# Patient Record
Sex: FEMALE | Race: WHITE | NOT HISPANIC OR LATINO | Employment: STUDENT | URBAN - METROPOLITAN AREA
[De-identification: names, ages, dates, MRNs, and addresses within clinical notes are randomized per-mention and may not be internally consistent; named-entity substitution may affect disease eponyms.]

---

## 2017-03-10 ENCOUNTER — ALLSCRIPTS OFFICE VISIT (OUTPATIENT)
Dept: OTHER | Facility: OTHER | Age: 22
End: 2017-03-10

## 2017-05-10 ENCOUNTER — ALLSCRIPTS OFFICE VISIT (OUTPATIENT)
Dept: OTHER | Facility: OTHER | Age: 22
End: 2017-05-10

## 2017-05-10 DIAGNOSIS — Z11.3 ENCOUNTER FOR SCREENING FOR INFECTIONS WITH PREDOMINANTLY SEXUAL MODE OF TRANSMISSION: ICD-10-CM

## 2017-05-15 LAB
CHLAMYDIA, LIQUID-BASED (HISTORICAL): NOT DETECTED
GC BY MOL. METHOD (HISTORICAL): NOT DETECTED
PAP (HISTORICAL): NORMAL

## 2017-12-19 ENCOUNTER — ALLSCRIPTS OFFICE VISIT (OUTPATIENT)
Dept: OTHER | Facility: OTHER | Age: 22
End: 2017-12-19

## 2017-12-19 DIAGNOSIS — F41.9 ANXIETY DISORDER: ICD-10-CM

## 2017-12-19 DIAGNOSIS — Z79.899 OTHER LONG TERM (CURRENT) DRUG THERAPY: ICD-10-CM

## 2017-12-20 ENCOUNTER — LAB CONVERSION - ENCOUNTER (OUTPATIENT)
Dept: OTHER | Facility: OTHER | Age: 22
End: 2017-12-20

## 2017-12-20 LAB
A/G RATIO (HISTORICAL): 1.7 (CALC) (ref 1–2.5)
ALBUMIN SERPL BCP-MCNC: 4.2 G/DL (ref 3.6–5.1)
ALP SERPL-CCNC: 73 U/L (ref 33–115)
ALT SERPL W P-5'-P-CCNC: 11 U/L (ref 6–29)
AST SERPL W P-5'-P-CCNC: 16 U/L (ref 10–30)
BASOPHILS # BLD AUTO: 0.8 %
BASOPHILS # BLD AUTO: 49 CELLS/UL (ref 0–200)
BILIRUB SERPL-MCNC: 0.5 MG/DL (ref 0.2–1.2)
BUN SERPL-MCNC: 11 MG/DL (ref 7–25)
BUN/CREA RATIO (HISTORICAL): NORMAL (CALC) (ref 6–22)
CALCIUM SERPL-MCNC: 9.2 MG/DL (ref 8.6–10.2)
CHLORIDE SERPL-SCNC: 107 MMOL/L (ref 98–110)
CO2 SERPL-SCNC: 27 MMOL/L (ref 20–31)
CREAT SERPL-MCNC: 0.76 MG/DL (ref 0.5–1.1)
DEPRECATED RDW RBC AUTO: 12.1 % (ref 11–15)
EGFR AFRICAN AMERICAN (HISTORICAL): 129 ML/MIN/1.73M2
EGFR-AMERICAN CALC (HISTORICAL): 111 ML/MIN/1.73M2
EOSINOPHIL # BLD AUTO: 0.7 %
EOSINOPHIL # BLD AUTO: 43 CELLS/UL (ref 15–500)
GAMMA GLOBULIN (HISTORICAL): 2.5 G/DL (CALC) (ref 1.9–3.7)
GLUCOSE (HISTORICAL): 88 MG/DL (ref 65–99)
HCT VFR BLD AUTO: 37.4 % (ref 35–45)
HGB BLD-MCNC: 13 G/DL (ref 11.7–15.5)
LYMPHOCYTES # BLD AUTO: 2233 CELLS/UL (ref 850–3900)
LYMPHOCYTES # BLD AUTO: 36.6 %
MCH RBC QN AUTO: 30.3 PG (ref 27–33)
MCHC RBC AUTO-ENTMCNC: 34.8 G/DL (ref 32–36)
MCV RBC AUTO: 87.2 FL (ref 80–100)
MONOCYTES # BLD AUTO: 512 CELLS/UL (ref 200–950)
MONOCYTES (HISTORICAL): 8.4 %
NEUTROPHILS # BLD AUTO: 3264 CELLS/UL (ref 1500–7800)
NEUTROPHILS # BLD AUTO: 53.5 %
PLATELET # BLD AUTO: 187 THOUSAND/UL (ref 140–400)
PMV BLD AUTO: 10.6 FL (ref 7.5–12.5)
POTASSIUM SERPL-SCNC: 3.9 MMOL/L (ref 3.5–5.3)
RBC # BLD AUTO: 4.29 MILLION/UL (ref 3.8–5.1)
SODIUM SERPL-SCNC: 139 MMOL/L (ref 135–146)
TOTAL PROTEIN (HISTORICAL): 6.7 G/DL (ref 6.1–8.1)
TSH SERPL DL<=0.05 MIU/L-ACNC: 1.3 MIU/L
WBC # BLD AUTO: 6.1 THOUSAND/UL (ref 3.8–10.8)

## 2017-12-21 ENCOUNTER — GENERIC CONVERSION - ENCOUNTER (OUTPATIENT)
Dept: OTHER | Facility: OTHER | Age: 22
End: 2017-12-21

## 2018-01-10 NOTE — MISCELLANEOUS
Message  Return to work or school:  01/07/2016   She is able to return to work on  01/13/2016      PLEASE EXCUSE Josef Florence FROM WORK 01/8-01/12 MAY RETURN 01/13/2016     DR Oksana Sterling MD/ [place] MA       Signatures   Electronically signed by : Ronaldo Martinez, ; Jan 12 2016  5:00PM EST                       (Author)

## 2018-01-10 NOTE — PROGRESS NOTES
Chief Complaint  Patient presented in office for #3 Trumemba vaccine  Active Problems    1  Anxiety (300 00) (F41 9)   2  History of Birth Control Method - Oral Contraceptives   3  High risk medication use (V58 69) (Z79 899)   4  Insertion of Nexplanon (V25 5) (Z30 017)   5  Insomnia (780 52) (G47 00)   6  Need for meningococcal vaccination (V03 89) (Z23)   7  Vaginal yeast infection (112 1) (B37 3)    Current Meds   1  Gynazole-1 2 % Vaginal Cream; INSERT 1 APPLICATORFUL INTRAVAGINALLY AT   BEDTIME; Therapy: 38LXW8974 to (Last Rx:16Nov2016)  Requested for: 77GJY4439 Ordered   2  Nexplanon 68 MG Subcutaneous Implant; Therapy: (Recorded:27Jun2016) to Recorded   3  Proventil  (90 Base) MCG/ACT Inhalation Aerosol Solution; 2 puff INH q 4-6 h   prn; Therapy: 20KTV3272 to (Last Rx:51Vpu5642)  Requested for: 03Njk1010 Ordered    Allergies    1  Delsym LQCR   2   Robitussin DM SYRP    Plan  Need for meningococcal vaccination    · Trumenba Intramuscular Suspension Prefilled Syringe    Future Appointments    Date/Time Provider Specialty Site   03/15/2017 02:00 PM Bessie Vaughn, [facility] Obstetrics/Gynecology [facility]     Signatures   Electronically signed by : Marcelo Hsieh, ; Mar 10 2017  1:01PM EST                       (Author)    Electronically signed by : Juliano Castillo; Mar 10 2017  1:07PM EST                       (Author)    Electronically signed by : Marco A Hawikns DO; Mar 11 2017 10:21PM EST                       (Author)

## 2018-01-10 NOTE — PROGRESS NOTES
Chief Complaint  Patient presented in office for #2 Trumenba vaccine  Active Problems    1  Anxiety (300 00) (F41 9)   2  History of Birth Control Method - Oral Contraceptives   3  High risk medication use (V58 69) (Z79 899)   4  Insertion of Nexplanon (V25 5) (Z30 017)   5  Insomnia (780 52) (G47 00)   6  Need for meningococcal vaccination (V03 89) (Z23)    Current Meds   1  Fluconazole 150 MG Oral Tablet; Take pill 1 stat, skip a day, and take pill 2 on day 3; Therapy: 20ABD6061 to (Last Rx:05Oct2016)  Requested for: 05Oct2016 Ordered   2  Nexplanon 68 MG Subcutaneous Implant; Therapy: (Recorded:27Jun2016) to Recorded   3  Proventil  (90 Base) MCG/ACT Inhalation Aerosol Solution; 2 puff INH q 4-6 h   prn; Therapy: 57WMW3983 to (Last Rx:28Lvw6332)  Requested for: 14Nuq2658 Ordered    Allergies    1  Delsym LQCR   2   Robitussin DM SYRP    Plan  Need for meningococcal vaccination    · Trumenba Intramuscular Suspension Prefilled Syringe    Future Appointments    Date/Time Provider Specialty Site   03/10/2017 01:00 PM Jersey HALL, Nurse Schedule  [facility]     Signatures   Electronically signed by : Yovany Rivera, ; Oct  7 2016  1:17PM EST                       (Author)    Electronically signed by : Jalen Zarco MD; Oct  7 2016  1:52PM EST                       (Author)

## 2018-01-11 NOTE — RESULT NOTES
Verified Results  (1) ACUTE HEPATITIS PANEL 28Jun2016 09:42AM Yovany Martínez     Test Name Result Flag Reference   HEPATITIS A IGM NON-REACTIVE  NON-REACTIVE   HEPATITIS B SURFACE$ANTIGEN NON-REACTIVE  NON-REACTIVE   HEPATITIS B CORE$ANTIBODY (IGM) NON-REACTIVE  NON-REACTIVE   HEPATITIS C ANTIBODY NON-REACTIVE  NON-REACTIVE   SIGNAL TO CUT-OFF 0 03  <1 00     (1) HIV AG/AB COMBO, 4TH GEN 28Jun2016 09:42AM Yovany Martínez     Test Name Result Flag Reference   HIV AG/AB, 4TH GEN NON-REACTIVE  NON-REACTIVE   HIV-1 antigen and HIV-1/HIV-2 antibodies were not  detected  There is no laboratory evidence of HIV  infection  PLEASE NOTE: This information has been disclosed to  you from records whose confidentiality may be  protected by state law  If your state requires such  protection, then the state law prohibits you from  making any further disclosure of the information  without the specific written consent of the person  to whom it pertains, or as otherwise permitted by law  A general authorization for the release of medical or  other information is NOT sufficient for this purpose  For additional information please refer to  http://education  Karmasphere/faq/EZG337  (This link is being provided for informational/  educational purposes only )        The performance of this assay has not been clinically  validated in patients less than 3years old  [facility](R) CHLAMYDIA/ N  GONORRHOEAE RNA, TMA 37RFT1053 09:42AM Yovany Martínez     Test Name Result Flag Reference   CHLAMYDIA TRACHOMATIS$RNA, TMA NOT DETECTED  NOT DETECTED   NEISSERIA [address], TMA NOT DETECTED  NOT DETECTED   This test was performed using the [address]  ([address] )  The analytical performance characteristics of this   assay, when used to test SurePath specimens have  been determined by [employer]             (Q) RPR (DX) W/REFL TITER AND CONFIRMATORY TESTING 28Jun2016 09:42AM Cr Lockhart   REPORT COMMENT:  MULT REQ#'S  FASTING:YES     Test Name Result Flag Reference   RPR (DX) W/REFL TITER AND$CONFIRMATORY TESTING NON-REACTIVE  NON-REACTIVE

## 2018-01-12 NOTE — RESULT NOTES
Verified Results  (Q) USMAN BARR VIRUS ANTIBODY PANEL 12Jan2016 03:16PM Ludivina Gar     Test Name Result Flag Reference   USMAN METZ VIRUS VCA$ANTIBODY (IGM) < OR = 0 90     Value         Interpretation                             -----         --------------                             < or = 0 90   Negative                             0  91-1 09     Equivocal                             > or = 1 10   Positive   USMAN METZ VIRUS VCA$ANTIBODY (IGG) >5 00 H    Value         Interpretation                             -----         --------------                             < or = 0 90   Negative                             0  91-1 09     Equivocal                             > or = 1 10   Positive   USMAN BARR VIRUS (EBNA)$ANTIBODY (IGG) >5 00 H    Value         Interpretation                             -----         --------------                             < or = 0 90   Negative                             0  91-1 09     Equivocal                             > or = 1 10   Positive   INTERPRETATION:      Suggestive of a past Usman-Barr virus infection  In infants, a similar pattern may occur as a result  of passive maternal transfer of antibody  (1) ACUTE HEPATITIS PANEL 12Jan2016 03:16PM Ludivina Gar     Test Name Result Flag Reference   HEPATITIS A IGM NON-REACTIVE  NON-REACTIVE   HEPATITIS B SURFACE$ANTIGEN NON-REACTIVE  NON-REACTIVE   HEPATITIS B CORE$ANTIBODY (IGM) NON-REACTIVE  NON-REACTIVE   HEPATITIS C ANTIBODY NON-REACTIVE  NON-REACTIVE   SIGNAL TO CUT-OFF 0 08  <1 00     (1) HIV AG/AB COMBO, 4TH GEN [id number] 03:16PM Carlyn Gar     Test Name Result Flag Reference   HIV AG/AB, 4TH GEN NON-REACTIVE  NON-REACTIVE   A Nonreactive HIV Ag/Ab result does not exclude  HIV infection since the time frame for seroconversion  is variable  If acute HIV infection is suspected,  a HIV-1 RNA Qualitative TMA test is recommended       PLEASE NOTE: This information has been disclosed to you  from records whose confidentiality may be protected by  state law  If your state requires such protection, then  the state law prohibits you from making any further  disclosure of the information without the specific  written consent of the person to whom it pertains, or  as otherwise permitted by law  A general authorization  for the release of medical or other information is NOT  sufficient for this purpose  The performance of this assay has not been clinically  validated in patients less than 3years old  For additional information please refer to  http://Sepaton/faq/XZD587  (This link is being provided for informational/  educational purposes only )     (1) COMPREHENSIVE METABOLIC PANEL [id number] 68:30OZ [facility] [employer]     Test Name Result Flag Reference   GLUCOSE 85 mg/dL  65-99   Fasting reference interval   UREA NITROGEN (BUN) 16 mg/dL  7-25   CREATININE 0 81 mg/dL  0 50-1 10   eGFR NON-AFR  AMERICAN 105 mL/min/1 73m2  > OR = 60   eGFR AFRICAN AMERICAN 121 mL/min/1 73m2  > OR = 60   BUN/CREATININE RATIO   0-16   NOT APPLICABLE (calc)   SODIUM 136 mmol/L  135-146   POTASSIUM 4 8 mmol/L  3 5-5 3   CHLORIDE 104 mmol/L     CARBON DIOXIDE 17 mmol/L L 19-30   CALCIUM 9 9 mg/dL  8 6-10 2   PROTEIN, TOTAL 8 1 g/dL  6 1-8 1   ALBUMIN 4 5 g/dL  3 6-5 1   GLOBULIN 3 6 g/dL (calc)  1 9-3 7   ALBUMIN/GLOBULIN RATIO 1 3 (calc)  1 0-2 5   BILIRUBIN, TOTAL 0 3 mg/dL  0 2-1 2   ALKALINE PHOSPHATASE 75 U/L     AST 61 U/L H 10-30   ALT 49 U/L H 6-29     SURESWAB(R) CHLAMYDIA/ N  GONORRHOEAE RNA, TMA [id number] 03:16PM [facility] [facility] [employer]     Test Name Result Flag Reference   CHLAMYDIA TRACHOMATIS$RNA, TMA DETECTED A NOT DETECTED   A positive CT or NG Nucleic Acid Amplification Test  (NAAT) result should be interpreted in conjunction  with other laboratory and clinical data available to  the clinician   If clinically indicated, further   testing can be performed on the same sample using  an alternate molecular target  To order alternate  target test use 22579 (C  trachomatis) or   11960 (N  gonorrhoeae)  NEISSERIA GONORRHOEAE$RNA, TMA NOT DETECTED  NOT DETECTED   This test was performed using the [employer] COMBO2 Assay  ([address] )  The analytical performance characteristics of this   assay, when used to test SurePath specimens have  been determined by [employer]  (Q) RPR (DX) W/REFL TITER AND CONFIRMATORY TESTING [id number] 03:16PM Ludivina Gar   REPORT COMMENT:  FASTING:NO     Test Name Result Flag Reference   RPR (DX) W/REFL TITER AND$CONFIRMATORY TESTING NON-REACTIVE  NON-REACTIVE       Plan  Chlamydial infection    · Azithromycin 500 MG Oral Tablet; TAKE 2 TABLET ONCE    Discussion/Summary   Mother made aware of the chlamydia positive results, Pt treated with Azithromycin  She was also advised to inform her partner about the infection

## 2018-01-12 NOTE — PROGRESS NOTES
Chief Complaint  Patient presented in office for Trumenba vaccine  Active Problems    1  Anxiety (300 00) (F41 9)   2  History of Birth Control Method - Oral Contraceptives   3  Esophageal reflux (530 81) (K21 9)   4  Exposure to STD (V01 6) (Z20 2)   5  High risk medication use (V58 69) (Z79 899)   6  Insertion of Nexplanon (V25 5) (Z30 49)   7  Insomnia (780 52) (G47 00)    Current Meds   1  Nexplanon 68 MG Subcutaneous Implant; Therapy: (Recorded:27Jun2016) to Recorded    Allergies    1  Delsym LQCR   2   Robitussin DM SYRP    Plan  Need for meningococcal vaccination    · Trumenba Intramuscular Suspension Prefilled Syringe    Signatures   Electronically signed by : Maury Patel, ; Sep  2 2016  1:50PM EST                       (Author)    Electronically signed by : India Gifford DO; Sep  2 2016  1:59PM EST                       (Author)

## 2018-01-13 NOTE — RESULT NOTES
Message   Will call pt back when full STD panel results are available  HIV, Acute hepatitis, RPR pending  GC/ Chlamydia is negative        Verified Results  Urine Dip Automated- POC [id number] 08:13PM Ludivina Gar     Test Name Result Flag Reference   Color Yellow     Clarity Transparent     Leukocytes 500     Nitrite neg     Blood 250     Bilirubin 1     Urobilinogen norm     Protein tr     Ph 5     Specific Gravity 1 020     Ketone 15     Glucose norm       (1) URINALYSIS w URINE C/S REFLEX (will reflex a microscopy if leukocytes, occult blood, or nitrites are not within normal limits) [id number] 08:00PM Ludivina Gar     Test Name Result Flag Reference   COLOR YELLOW  YELLOW   APPEARANCE TURBID A CLEAR   SPECIFIC GRAVITY 1 024  1 001-1 035   PH 5 5  5 0-8 0   GLUCOSE NEGATIVE  NEGATIVE   BILIRUBIN NEGATIVE  NEGATIVE   KETONES TRACE A NEGATIVE   OCCULT BLOOD 2+ A NEGATIVE   PROTEIN 1+ A NEGATIVE   NITRITE NEGATIVE  NEGATIVE   LEUKOCYTE ESTERASE 3+ A NEGATIVE   WBC > OR = 60 /HPF A < OR = 5   RBC 10-20 /HPF A < OR = 2   SQUAMOUS EPITHELIAL CELLS > OR = 28 /HPF A < OR = 5   BACTERIA MODERATE /HPF A NONE SEEN   HYALINE CAST NONE SEEN /LPF  NONE SEEN   REFLEXIVE URINE CULTURE      CULTURE INDICATED - RESULTS TO FOLLOW     REFLEXIVE URINE CULTURE [id number] 08:00PM Ludivina Gar     Test Name Result Flag Reference   CULTURE, URINE, ROUTINE      CULTURE, URINE, ROUTINE         MICRO NUMBER:      [id number]    TEST STATUS:       FINAL    SPECIMEN SOURCE:   URINE    SPECIMEN QUALITY:  ADEQUATE    RESULT:            No Growth

## 2018-01-14 VITALS
HEIGHT: 66 IN | DIASTOLIC BLOOD PRESSURE: 84 MMHG | SYSTOLIC BLOOD PRESSURE: 124 MMHG | BODY MASS INDEX: 24.11 KG/M2 | WEIGHT: 150 LBS

## 2018-01-17 NOTE — PROGRESS NOTES
Assessment    1  Encounter for preventive health examination (V70 0) (Z00 00)    Plan  Health Maintenance    · Follow-up visit in 1 year Evaluation and Treatment  Follow-up  Status: Hold For -  Scheduling  Requested for: 17Eqc3385    Discussion/Summary    Pt  is here for a wellness visit  Physical forms filled out for college  Pt  encouraged to continue to exercise and eat healthy  Pt  encouraged to continue to follow-up with the GYN, dentist, and eye doctor  Pt  refuses lab work  Pt  instructed to follow-up in 1 year for a wellness visit or sooner as needed  Chief Complaint  Pt  is here for a wellness visit  History of Present Illness  HM, Adult Female: The patient is being seen for a health maintenance evaluation  Social History: Household members include mother and stepfather  Work status: full time student  The patient has never smoked cigarettes  She reports occasional alcohol use  General Health: The patient's health since the last visit is described as good  She has regular dental visits  She denies hearing loss  Immunizations status:  Pt  wears glasses and follows up with the eye doctor  Lifestyle:  She consumes a diverse and healthy diet  She exercises regularly  She exercises 3 or more times per week for 30 or more minutes per session  Exercise includes running/jogging and strength training  Reproductive health:  she uses contraception  For contraception, she uses hormone implants  she is sexually active  Screening: Cervical cancer screening includes Pt  reports that she follows up with the GYN  Metabolic screening includes uncertain timing of her last lipid profile, glucose screening performed 5054 and uncertain timing of her last thyroid function test      Safety elements used: seat belt and smoke detector  HPI: Pt  is here for a wellness visit and physical exam for [facility]  Review of Systems    Constitutional: no fever, no chills and not feeling tired     Eyes: no eye pain, eyes not red and no purulent discharge from the eyes  ENT: no earache, no sore throat and no nasal discharge  Cardiovascular: no chest pain and no palpitations  Respiratory: no shortness of breath, no cough, no wheezing and no shortness of breath during exertion  Gastrointestinal: No complaints of abdominal pain, no constipation, no nausea or vomiting, no diarrhea, no bloody stools  Genitourinary: no dysuria, no pelvic pain and no vaginal discharge  Musculoskeletal: No complaints of arthralgias, no myalgias, no joint swelling or stiffness, no limb pain or swelling  Integumentary: no rashes  Neurological: no headache, no numbness, no tingling, no dizziness, no limb weakness, no convulsions, no fainting and no difficulty walking  Psychiatric: not suicidal and no depression  Active Problems    1  Anxiety (300 00) (F41 9)   2  History of Birth Control Method - Oral Contraceptives   3  Esophageal reflux (530 81) (K21 9)   4  Exposure to STD (V01 6) (Z20 2)   5  High risk medication use (V58 69) (Z79 899)   6  Insertion of Nexplanon (V25 5) (Z30 49)   7   Insomnia (780 52) (G47 00)    Past Medical History    · History of _   · History of Acute maxillary sinusitis (461 0) (J01 00)   · History of Acute tonsillitis (463) (J03 90)   · History of Acute upper respiratory infection (465 9) (J06 9)   · History of Acute upper respiratory infection (465 9) (J06 9)   · History of Acute urinary tract infection (599 0) (N39 0)   · History of Cervicalgia (723 1) (M54 2)   · History of Chalazion of right eye (373 2) (H00 13)   · History of Encounter for gynecological examination without abnormal finding (V72 31)  (Z01 419)   · History of Encounter for gynecological examination without abnormal finding (V72 31)  (Z01 419)   · History of Exercise-induced bronchospasm (493 81) (J45 990)   · History of Exercise-induced bronchospasm (493 81) (J45 990)   · History of High risk heterosexual behavior (V69 2) (Z72 51)   · History of acne (V13 3) (Z87 2)   · History of acne (V13 3) (Z87 2)   · History of acute gastritis (V12 70) (Z87 19)   · History of acute pharyngitis (V12 69) (Z87 09)   · History of acute sinusitis (V12 69) (Z87 09)   · History of allergic rhinitis (V12 69) (Z87 09)   · History of asthma (V12 69) (Z87 09)   · History of backache (V13 59) (Z87 39)   · History of breast lump (V13 89) (L40 613)   · History of chlamydia infection (V12 09) (Z86 19)   · History of fatigue (V13 89) (E32 702)   · History of folliculitis (W97 7) (K47 8)   · History of headache (V13 89) (R80 790)   · History of infectious mononucleosis (V12 09) (Z86 19)   · History of influenza (V12 09) (Z87 09)   · History of tension headache (V13 89) (E75 032)   · History of tinea corporis (V12 09) (Z86 19)   · History of viral infection (V12 09) (Z86 19)   · History of Joint pain, knee (719 46) (M25 569)   · History of Multiple Open Wounds (879 8)   · History of Nasal congestion (478 19) (R09 81)   · History of Need for prophylactic vaccination and inoculation against influenza (V04 81)  (Z23)   · History of Open Wound Of The Foot (892 0)   · History of Pain in eye, unspecified laterality (379 91) (H57 10)   · History of Scar (709 2) (L90 5)   · History of Sprain Of The Symphysis Pubis (848 5)   · History of Vaginal yeast infection (112 1) (B37 3)   · History of Vulvovaginitis (616 10) (N76 0)    Surgical History    · History of Guidance: Concerns About Unsafe Sexual Practices (V65 45)   · History of Tonsillectomy    Family History  Mother    · No pertinent family history  Father    · No pertinent family history  Paternal Grandmother    · Family history of breast cancer in female (V16 3) (Z80 2)    Social History    · History of Birth Control Method - Oral Contraceptives   · Cultural background   · NON-   · Currently sexually active   · Never A Smoker   · Nexplanon in place (Y15 11) (Z97 5)   · 3/15/16   · No drug use   · Primary spoken language English   · Racial background   · WHITE   · Single   · Social alcohol use (Z78 9)    Current Meds   1  Nexplanon 68 MG Subcutaneous Implant; Therapy: (Recorded:27Jun2016) to Recorded    Allergies    1  Delsym LQCR   2  Robitussin DM SYRP    Vitals   Recorded: 91FJW7895 44:21JK   Systolic 277   Diastolic 58   Heart Rate 72   Respiration 16   Height 5 ft 5 5 in   Weight 140 lb    BMI Calculated 22 94   BSA Calculated 1 7     Physical Exam    Constitutional   General appearance: No acute distress, well appearing and well nourished  Eyes   Conjunctiva and lids: No swelling, erythema or discharge  Pupils and irises: Equal, round, reactive to light  Ears, Nose, Mouth, and Throat   External inspection of ears and nose: Normal     Otoscopic examination: Tympanic membranes translucent with normal light reflex  Canals patent without erythema  Nasal mucosa, septum, and turbinates: Normal without edema or erythema  Oropharynx: Normal with no erythema, edema, exudate or lesions  Neck   Neck: Supple, symmetric, trachea midline, no masses  Thyroid: Normal, no thyromegaly  Pulmonary   Respiratory effort: No increased work of breathing or signs of respiratory distress  Auscultation of lungs: Clear to auscultation  Cardiovascular   Auscultation of heart: Normal rate and rhythm, normal S1 and S2, no murmurs  radial pulses +2 bilaterally  Pedal pulses: 2+ bilaterally  No edema noted  Abdomen   Abdomen: Non-tender, no masses  Liver and spleen: No hepatomegaly or splenomegaly  Lymphatic   Palpation of lymph nodes in neck: No lymphadenopathy  Palpation of lymph nodes in groin: No lymphadenopathy  Musculoskeletal   Gait and station: Normal     Digits and nails: Normal without clubbing or cyanosis  Muscle strength/tone: Normal     Skin No rashes noted  Neurologic   Cranial nerves: Cranial nerves II-XII intact  Coordination: Normal finger to nose and heel to shin  Psychiatric   Orientation to person, place, and time: Normal     Mood and affect: Normal        Procedure    Procedure: Visual Acuity Test    Indication: routine screening  Inforrmation supplied by a Snellen chart  Results: 20/20 in both eyes with corrective device, 20/20 in the right eye with corrective device, 20/20 in the left eye with corrective device      Signatures   Electronically signed by : CHELLY Srinivasan;  Aug 16 2016  9:19PM EST                       (Author)    Electronically signed by : GEOFFREY Franco ; Aug 17 2016 12:12PM EST

## 2018-01-23 VITALS
WEIGHT: 149 LBS | DIASTOLIC BLOOD PRESSURE: 82 MMHG | SYSTOLIC BLOOD PRESSURE: 120 MMHG | RESPIRATION RATE: 16 BRPM | OXYGEN SATURATION: 99 % | HEIGHT: 66 IN | BODY MASS INDEX: 23.95 KG/M2 | HEART RATE: 78 BPM

## 2018-01-23 NOTE — RESULT NOTES
Discussion/Summary   Barbie your labs are all within normal limits  Please call the office to schedule a follow-up if you have any questions or concerns with the results  Verified Results  (Q) TSH, 3RD GENERATION W/REFLEX TO FT4 [id number] 10:56AM Ludivina Gar   REPORT COMMENT:  INCLUDES LAB REF [id number]  FASTING:YES     Test Name Result Flag Reference   TSH W/REFLEX TO FT4 1 30 mIU/L     Reference Range                         > or = 20 Years  0 40-4 50                              Pregnancy Ranges            First trimester    0 26-2 66            Second trimester   0 55-2 73            Third trimester    0 43-2 91     (1) COMPREHENSIVE METABOLIC PANEL [id number] 12:81OR Ludivina Gar     Test Name Result Flag Reference   GLUCOSE 88 mg/dL  65-99   Fasting reference interval   UREA NITROGEN (BUN) 11 mg/dL  7-25   CREATININE 0 76 mg/dL  0 50-1 10   eGFR NON-AFR   AMERICAN 111 mL/min/1 73m2  > OR = 60   eGFR AFRICAN AMERICAN 129 mL/min/1 73m2  > OR = 60   BUN/CREATININE RATIO   8-05   NOT APPLICABLE (calc)   SODIUM 139 mmol/L  135-146   POTASSIUM 3 9 mmol/L  3 5-5 3   CHLORIDE 107 mmol/L     CARBON DIOXIDE 27 mmol/L  20-31   CALCIUM 9 2 mg/dL  8 6-10 2   PROTEIN, TOTAL 6 7 g/dL  6 1-8 1   ALBUMIN 4 2 g/dL  3 6-5 1   GLOBULIN 2 5 g/dL (calc)  1 9-3 7   ALBUMIN/GLOBULIN RATIO 1 7 (calc)  1 0-2 5   BILIRUBIN, TOTAL 0 5 mg/dL  0 2-1 2   ALKALINE PHOSPHATASE 73 U/L     AST 16 U/L  10-30   ALT 11 U/L  6-29     (1) CBC/PLT/DIFF [id number] 10:56AM Ludivina Gar     Test Name Result Flag Reference   WHITE BLOOD CELL COUNT 6 1 Thousand/uL  3 8-10 8   RED BLOOD CELL COUNT 4 29 Million/uL  3 80-5 10   HEMOGLOBIN 13 0 g/dL  11 7-15 5   HEMATOCRIT 37 4 %  35 0-45 0   MCV 87 2 fL  80 0-100 0   MCH 30 3 pg  27 0-33 0   MCHC 34 8 g/dL  32 0-36 0   RDW 12 1 %  11 0-15 0   PLATELET COUNT 794 Thousand/uL  140-400   ABSOLUTE NEUTROPHILS 3264 cells/uL  9930-0285   ABSOLUTE LYMPHOCYTES 2233 cells/uL  850-3900 ABSOLUTE MONOCYTES 512 cells/uL  200-950   ABSOLUTE EOSINOPHILS 43 cells/uL     ABSOLUTE BASOPHILS 49 cells/uL  0-200   NEUTROPHILS 53 5 %     LYMPHOCYTES 36 6 %     MONOCYTES 8 4 %     EOSINOPHILS 0 7 %     BASOPHILS 0 8 %     MPV 10 6 fL  7 5-12 5

## 2018-03-12 ENCOUNTER — TELEPHONE (OUTPATIENT)
Dept: OBGYN CLINIC | Facility: CLINIC | Age: 23
End: 2018-03-12

## 2018-03-23 ENCOUNTER — TELEPHONE (OUTPATIENT)
Dept: OBGYN CLINIC | Facility: CLINIC | Age: 23
End: 2018-03-23

## 2018-03-23 DIAGNOSIS — B37.3 YEAST VAGINITIS: ICD-10-CM

## 2018-03-23 DIAGNOSIS — B96.89 BV (BACTERIAL VAGINOSIS): Primary | ICD-10-CM

## 2018-03-23 DIAGNOSIS — N76.0 BV (BACTERIAL VAGINOSIS): Primary | ICD-10-CM

## 2018-03-23 RX ORDER — FLUCONAZOLE 150 MG/1
150 TABLET ORAL EVERY OTHER DAY
Qty: 2 TABLET | Refills: 0 | OUTPATIENT
Start: 2018-03-23 | End: 2018-03-26

## 2018-03-23 RX ORDER — METRONIDAZOLE 500 MG/1
500 TABLET ORAL EVERY 12 HOURS SCHEDULED
Qty: 14 TABLET | Refills: 0 | OUTPATIENT
Start: 2018-03-23 | End: 2018-03-30

## 2018-03-23 NOTE — TELEPHONE ENCOUNTER
Mom Cuauhtemoc calling in regarding daughter, thinks she has a bacterial infection, she can tell by the smell  Can we call in pills and cream, wants to see which one is cheaper to [facility] in [address] [place] [address]  Also can we send in a script for Diflucan as well    Call

## 2018-05-11 ENCOUNTER — TELEPHONE (OUTPATIENT)
Dept: FAMILY MEDICINE CLINIC | Facility: CLINIC | Age: 23
End: 2018-05-11

## 2018-05-11 NOTE — TELEPHONE ENCOUNTER
omaira patient: Patients mom called in to say that she noticed the patients hair is really thinning and falling out  She was wondering if they can get an order for blood work to have her levels checked and her thyroid tested  The patient is leaving for [facility] for her graduate program and until she gets acclimated with a doctor down there they would like to have labs done  If we can do this can we fax them?     FAX: [phone]

## 2018-05-29 ENCOUNTER — TELEPHONE (OUTPATIENT)
Dept: OBGYN CLINIC | Facility: CLINIC | Age: 23
End: 2018-05-29

## 2018-05-29 DIAGNOSIS — Z30.41 ENCOUNTER FOR SURVEILLANCE OF CONTRACEPTIVE PILLS: Primary | ICD-10-CM

## 2018-05-30 RX ORDER — NORETHINDRONE ACETATE AND ETHINYL ESTRADIOL 1; .02 MG/1; MG/1
1 TABLET ORAL DAILY
Qty: 21 TABLET | Refills: 1 | Status: SHIPPED | OUTPATIENT
Start: 2018-05-30 | End: 2018-08-27 | Stop reason: SDUPTHER

## 2018-08-23 DIAGNOSIS — B37.3 VAGINAL YEAST INFECTION: Primary | ICD-10-CM

## 2018-08-23 RX ORDER — FLUCONAZOLE 150 MG/1
150 TABLET ORAL EVERY OTHER DAY
Qty: 2 TABLET | Refills: 0 | Status: SHIPPED | OUTPATIENT
Start: 2018-08-23 | End: 2018-08-26

## 2018-08-27 DIAGNOSIS — Z30.41 ENCOUNTER FOR SURVEILLANCE OF CONTRACEPTIVE PILLS: ICD-10-CM

## 2018-08-28 RX ORDER — NORETHINDRONE ACETATE AND ETHINYL ESTRADIOL 1; .02 MG/1; MG/1
1 TABLET ORAL DAILY
Qty: 21 TABLET | Refills: 0 | Status: SHIPPED | OUTPATIENT
Start: 2018-08-28 | End: 2018-09-18 | Stop reason: SDUPTHER

## 2018-09-18 DIAGNOSIS — Z30.41 ENCOUNTER FOR SURVEILLANCE OF CONTRACEPTIVE PILLS: ICD-10-CM

## 2018-09-18 RX ORDER — NORETHINDRONE ACETATE AND ETHINYL ESTRADIOL 1; 20 MG/1; UG/1
TABLET ORAL
Qty: 21 TABLET | Refills: 2 | Status: SHIPPED | OUTPATIENT
Start: 2018-09-18 | End: 2018-12-17

## 2018-09-21 DIAGNOSIS — B96.89 BACTERIAL VAGINOSIS: Primary | ICD-10-CM

## 2018-09-21 DIAGNOSIS — N76.0 BACTERIAL VAGINOSIS: Primary | ICD-10-CM

## 2018-09-21 RX ORDER — METRONIDAZOLE 500 MG/1
500 TABLET ORAL 2 TIMES DAILY
Qty: 14 TABLET | Refills: 0 | Status: SHIPPED | OUTPATIENT
Start: 2018-09-21 | End: 2018-09-28

## 2018-10-26 DIAGNOSIS — B37.3 YEAST VAGINITIS: Primary | ICD-10-CM

## 2018-10-26 RX ORDER — FLUCONAZOLE 150 MG/1
150 TABLET ORAL EVERY OTHER DAY
Qty: 1 TABLET | Refills: 0 | Status: SHIPPED | OUTPATIENT
Start: 2018-10-26 | End: 2018-10-29

## 2018-11-21 ENCOUNTER — IMMUNIZATION (OUTPATIENT)
Dept: FAMILY MEDICINE CLINIC | Facility: CLINIC | Age: 23
End: 2018-11-21
Payer: COMMERCIAL

## 2018-11-21 DIAGNOSIS — Z23 ENCOUNTER FOR IMMUNIZATION: ICD-10-CM

## 2018-11-21 PROCEDURE — 90686 IIV4 VACC NO PRSV 0.5 ML IM: CPT

## 2018-11-21 PROCEDURE — 90471 IMMUNIZATION ADMIN: CPT

## 2018-12-03 ENCOUNTER — TELEPHONE (OUTPATIENT)
Dept: OBGYN CLINIC | Facility: CLINIC | Age: 23
End: 2018-12-03

## 2018-12-17 ENCOUNTER — ANNUAL EXAM (OUTPATIENT)
Dept: OBGYN CLINIC | Facility: CLINIC | Age: 23
End: 2018-12-17
Payer: COMMERCIAL

## 2018-12-17 VITALS
WEIGHT: 136 LBS | HEIGHT: 65 IN | DIASTOLIC BLOOD PRESSURE: 80 MMHG | SYSTOLIC BLOOD PRESSURE: 120 MMHG | BODY MASS INDEX: 22.66 KG/M2

## 2018-12-17 DIAGNOSIS — Z11.3 SCREENING FOR STD (SEXUALLY TRANSMITTED DISEASE): ICD-10-CM

## 2018-12-17 DIAGNOSIS — N76.0 RECURRENT VAGINITIS: ICD-10-CM

## 2018-12-17 DIAGNOSIS — Z01.419 WELL WOMAN EXAM: Primary | ICD-10-CM

## 2018-12-17 PROCEDURE — S0612 ANNUAL GYNECOLOGICAL EXAMINA: HCPCS | Performed by: PHYSICIAN ASSISTANT

## 2018-12-17 NOTE — PROGRESS NOTES
Assessment/Plan   Diagnoses and all orders for this visit:    Well woman exam  -     GP PAP/CT/GC (Reflex HPV PLUS when ASC-US)    Screening for STD (sexually transmitted disease)  -     Hepatitis B surface antigen; Future  -     Hepatitis C antibody; Future  -     HIV 1/2 AG-AB combo; Future  -     RPR; Future  -     GP Trichomonas By Multiplex PCR  -     GP Culture, Genital    Recurrent vaginitis  -     GP PAP/CT/GC (Reflex HPV PLUS when ASC-US)  -     GP Trichomonas By Multiplex PCR  -     GP Culture, Genital    Other orders  -     etonogestrel (NEXPLANON) subdermal implant; Inject under the skin        Discussion    All questions have been answered to her satisfaction  RTO for APE or sooner if needed    Subjective     Pt for annual GYN exam  Nexplanon inserted 3/16 plans removal and reinsertion  Does c/o increased vaginal infections  Treats for yeast routinely  Does feel liek she is having some irritation and sx now  She loves the Nexplanon but may consider changing in the future if she cant get the vaginitis sx under control  I did r/w pt acidophilus and possible chronic Difaucan tx if sx continue to recur  Will plan full STD work up today as well as genital cx to make sure we are treating the appropriate cause of vaginitis  Robert Collins is a 21 y o  female who presents for annual well woman exam    Menarche - ; LMP -none on Nexplanon   No vulvar itch/burn; No vaginal itch/burn; No abn discharge or odor; No urinary sx - burning/pain/frequency/hematuria  (+) SBEs - no breast masses, asymmetry, nipple discharge or bleeding, changes in skin of breast, or breast tenderness bilaterally  No abd/pelvic pain or HAs;   Pt is sexually active in a mutually monog sexual relationship; No issues with intercourse; She desires std/hiv/hep testing; Feels safe at home  Current contraception: Doc Artist (in right arm)    (+) PCP for routine Bw/care;     Last Pap - 5/10/17 WNL  Last STD screen - 5/10/17 WNL    Review of Systems   Constitutional: Negative for fatigue, fever and unexpected weight change  HENT: Negative for dental problem, mouth sores, nosebleeds, rhinorrhea, sinus pain, sinus pressure and sore throat  Eyes: Negative for pain, discharge and visual disturbance  Respiratory: Negative for cough, chest tightness, shortness of breath and wheezing  Cardiovascular: Negative for chest pain, palpitations and leg swelling  Gastrointestinal: Negative for blood in stool, constipation, diarrhea, nausea and vomiting  Endocrine: Negative for polydipsia  Genitourinary: Negative for difficulty urinating, dyspareunia, dysuria, menstrual problem, pelvic pain, urgency, vaginal discharge and vaginal pain  Musculoskeletal: Negative for arthralgias, back pain and joint swelling  Allergic/Immunologic: Negative for environmental allergies  Neurological: Negative for seizures, light-headedness and headaches  Hematological: Does not bruise/bleed easily  Psychiatric/Behavioral: Negative for sleep disturbance  The patient is not nervous/anxious          The following portions of the patient's history were reviewed and updated as appropriate: current medications, past family history, past medical history, past social history, past surgical history and problem list          OB History      Para Term  AB Living    0 0 0 0 0 0    SAB TAB Ectopic Multiple Live Births    0 0 0 0 0          Past Medical History:   Diagnosis Date    Asthma     Breast lump     Last assessed 2011    Tonsillitis        Past Surgical History:   Procedure Laterality Date    [place] REMOVE TONSILS/ADENOIDS,12+ Y/O N/A 2016    Procedure: tonsillectomy;  Surgeon: Rishi Knight MD;  Location:  MAIN OR;  Service: ENT    WISDOM TOOTH EXTRACTION         Family History   Problem Relation Age of Onset    No Known Problems Mother     No Known Problems Father     Breast cancer Paternal Grandmother        Social History     Social History    Marital status: Single     Spouse name: N/A    Number of children: N/A    Years of education: N/A     Occupational History    Not on file  Social History Main Topics    Smoking status: Never Smoker    Smokeless tobacco: Not on file    Alcohol use No      Comment: Per [employer]; Social use    Drug use: No    Sexual activity: Yes     Birth control/ protection: IUD      Comment: Nexplanon in place; Per [employer]     Other Topics Concern    Not on file     Social History Narrative    No narrative on file         Current Outpatient Prescriptions:     etonogestrel (Carlyle Buckfield) subdermal implant, Inject under the skin, Disp: , Rfl:     Allergies   Allergen Reactions    Delsym Cough-Cold Daytime [Phenylephrine-Dm-Gg-Apap]     Dextromethorphan     Robitussin Cough-Cold D [Pseudoephedrine-Dm-Gg]     Guaifenesin Rash       Objective   Vitals:    12/17/18 1344   BP: 120/80   BP Location: Left arm   Patient Position: Sitting   Cuff Size: Standard   Weight: 61 7 kg (136 lb)   Height: 5' 5" (1 651 m)     Physical Exam   Constitutional: She is oriented to person, place, and time  She appears well-developed and well-nourished  HENT:   Head: Normocephalic and atraumatic  Cardiovascular: Normal rate and regular rhythm  Pulmonary/Chest: Effort normal and breath sounds normal  Right breast exhibits no inverted nipple, no mass, no nipple discharge, no skin change and no tenderness  Left breast exhibits no inverted nipple, no mass, no nipple discharge, no skin change and no tenderness  Breasts are symmetrical    Abdominal: Soft  She exhibits no distension and no mass  There is no rebound and no guarding  Neurological: She is alert and oriented to person, place, and time  Skin: Skin is warm and dry  Psychiatric: She has a normal mood and affect  Patient Instructions   Will fu as needed base don cx results  Pt signed consent today for Nexplanon insertion   Will plan to do before she returns to school in [place]

## 2018-12-17 NOTE — PATIENT INSTRUCTIONS
Will fu as needed base don cx results  Pt signed consent today for Nexplanon insertion  Will plan to do before she returns to school in [place]

## 2018-12-20 ENCOUNTER — TELEPHONE (OUTPATIENT)
Dept: OBGYN CLINIC | Facility: CLINIC | Age: 23
End: 2018-12-20

## 2018-12-20 LAB — SL AMB GENITAL CULTURE: NORMAL

## 2018-12-20 NOTE — TELEPHONE ENCOUNTER
Routed Call to Landry Buck to review before the weekend   Once reviewed results are back I will inform Pt's mother

## 2018-12-20 NOTE — TELEPHONE ENCOUNTER
Pt's mother wants results from recent lab work from her daughter  Daughter is leaving town on Wednesday and if she need medications she would like to get that squared away before her daughter leaves

## 2018-12-21 LAB
DEPRECATED C TRACH RRNA XXX QL PRB: NOT DETECTED
N GONORRHOEA DNA UR QL NAA+PROBE: NOT DETECTED
T VAGINALIS RRNA SPEC QL NAA+PROBE: NOT DETECTED
THIN PREP CVX: NORMAL

## 2018-12-21 NOTE — TELEPHONE ENCOUNTER
Yes, sorry I thought I sent the message back  The only thing back so far is the genital culture and that was negative   I did not tell the patient though

## 2018-12-21 NOTE — TELEPHONE ENCOUNTER
Called mother Cuauhtemoc flores about neg genital culture and that we are still waiting on lab results from the other testing   Once we receive them we will call her

## 2019-01-01 ENCOUNTER — HOSPITAL ENCOUNTER (EMERGENCY)
Facility: HOSPITAL | Age: 24
Discharge: HOME | End: 2019-01-01
Attending: EMERGENCY MEDICINE
Payer: COMMERCIAL

## 2019-01-01 VITALS
OXYGEN SATURATION: 97 % | TEMPERATURE: 99.2 F | RESPIRATION RATE: 18 BRPM | DIASTOLIC BLOOD PRESSURE: 57 MMHG | HEART RATE: 62 BPM | SYSTOLIC BLOOD PRESSURE: 116 MMHG

## 2019-01-01 DIAGNOSIS — K52.9 GASTROENTERITIS: Primary | ICD-10-CM

## 2019-01-01 LAB
ALBUMIN SERPL BCP-MCNC: 4.1 G/DL (ref 3.5–5)
ALP SERPL-CCNC: 65 U/L (ref 46–116)
ALT SERPL W P-5'-P-CCNC: 43 U/L (ref 12–78)
ANION GAP SERPL CALCULATED.3IONS-SCNC: 9 MMOL/L (ref 4–13)
AST SERPL W P-5'-P-CCNC: 47 U/L (ref 5–45)
BILIRUB SERPL-MCNC: 0.5 MG/DL (ref 0.2–1)
BUN SERPL-MCNC: 12 MG/DL (ref 5–25)
CALCIUM SERPL-MCNC: 9.4 MG/DL (ref 8.3–10.1)
CHLORIDE SERPL-SCNC: 103 MMOL/L (ref 100–108)
CO2 SERPL-SCNC: 26 MMOL/L (ref 21–32)
CREAT SERPL-MCNC: 0.75 MG/DL (ref 0.6–1.3)
EXT PREG TEST URINE: NEGATIVE
GFR SERPL CREATININE-BSD FRML MDRD: 113 ML/MIN/1.73SQ M
GLUCOSE SERPL-MCNC: 100 MG/DL (ref 65–140)
LIPASE SERPL-CCNC: 98 U/L (ref 73–393)
POTASSIUM SERPL-SCNC: 3.8 MMOL/L (ref 3.5–5.3)
PROT SERPL-MCNC: 7.9 G/DL (ref 6.4–8.2)
SODIUM SERPL-SCNC: 138 MMOL/L (ref 136–145)

## 2019-01-01 PROCEDURE — 96361 HYDRATE IV INFUSION ADD-ON: CPT

## 2019-01-01 PROCEDURE — 83690 ASSAY OF LIPASE: CPT | Performed by: EMERGENCY MEDICINE

## 2019-01-01 PROCEDURE — 99284 EMERGENCY DEPT VISIT MOD MDM: CPT

## 2019-01-01 PROCEDURE — 80053 COMPREHEN METABOLIC PANEL: CPT | Performed by: EMERGENCY MEDICINE

## 2019-01-01 PROCEDURE — 36415 COLL VENOUS BLD VENIPUNCTURE: CPT | Performed by: EMERGENCY MEDICINE

## 2019-01-01 PROCEDURE — 81025 URINE PREGNANCY TEST: CPT | Performed by: EMERGENCY MEDICINE

## 2019-01-01 PROCEDURE — 96374 THER/PROPH/DIAG INJ IV PUSH: CPT

## 2019-01-01 PROCEDURE — 96375 TX/PRO/DX INJ NEW DRUG ADDON: CPT

## 2019-01-01 RX ORDER — ONDANSETRON 2 MG/ML
4 INJECTION INTRAMUSCULAR; INTRAVENOUS ONCE
Status: COMPLETED | OUTPATIENT
Start: 2019-01-01 | End: 2019-01-01

## 2019-01-01 RX ORDER — CIPROFLOXACIN 500 MG/1
500 TABLET, FILM COATED ORAL 2 TIMES DAILY
Qty: 6 TABLET | Refills: 0 | Status: SHIPPED | OUTPATIENT
Start: 2019-01-01 | End: 2019-01-04

## 2019-01-01 RX ORDER — CIPROFLOXACIN 500 MG/1
500 TABLET, FILM COATED ORAL ONCE
Status: COMPLETED | OUTPATIENT
Start: 2019-01-01 | End: 2019-01-01

## 2019-01-01 RX ORDER — KETOROLAC TROMETHAMINE 30 MG/ML
15 INJECTION, SOLUTION INTRAMUSCULAR; INTRAVENOUS ONCE
Status: COMPLETED | OUTPATIENT
Start: 2019-01-01 | End: 2019-01-01

## 2019-01-01 RX ORDER — ONDANSETRON 4 MG/1
4 TABLET, ORALLY DISINTEGRATING ORAL EVERY 6 HOURS PRN
Qty: 10 TABLET | Refills: 0 | Status: SHIPPED | OUTPATIENT
Start: 2019-01-01 | End: 2019-11-27

## 2019-01-01 RX ADMIN — CIPROFLOXACIN HYDROCHLORIDE 500 MG: 500 TABLET, FILM COATED ORAL at 23:00

## 2019-01-01 RX ADMIN — KETOROLAC TROMETHAMINE 15 MG: 30 INJECTION, SOLUTION INTRAMUSCULAR at 22:24

## 2019-01-01 RX ADMIN — ONDANSETRON 4 MG: 2 INJECTION INTRAMUSCULAR; INTRAVENOUS at 22:23

## 2019-01-01 RX ADMIN — SODIUM CHLORIDE 1000 ML: 0.9 INJECTION, SOLUTION INTRAVENOUS at 22:21

## 2019-01-02 NOTE — ED PROVIDER NOTES
History  Chief Complaint   Patient presents with    Abdominal Pain     Pt  with 6 episodes of N/D in past few hours, also complains of abdominal cramping  Just returned from 6 days in [address]  History provided by:  Patient   used: No     21year-old otherwise healthy female presented for evaluation of nausea, some vomiting and diarrhea beginning within the last 24 hours  She had just flew back from the [facility] today  Notes that she started feeling sick on her way to the airport  No blood or bile in the vomit or blood in the diarrhea  She has been having some crampy abdominal pain associated with this  No history of abdominal surgeries  She reports eating some abnormal chicken day prior and thinks it may be related to her symptoms  Slightly elevated temperature on arrival, tachycardic  Abdomen soft and nontender  Moist mucous membranes  Plan labs, fluids and will re-evaluate  Differential diagnosis includes viral gastroenteritis, food-borne illness, traveler's diarrhea  Prior to Admission Medications   Prescriptions Last Dose Informant Patient Reported? Taking?   etonogestrel (Robyn Holderjamargarita) subdermal implant   Yes Yes   Sig: Inject under the skin      Facility-Administered Medications: None       Past Medical History:   Diagnosis Date    Allergic     Asthma     Breast lump     Last assessed 2/16/2011    Tonsillitis        Past Surgical History:   Procedure Laterality Date    [place] REMOVE TONSILS/ADENOIDS,12+ Y/O N/A 7/13/2016    Procedure: tonsillectomy;  Surgeon: Anabell Morley MD;  [facility];  Service: ENT    WISDOM TOOTH EXTRACTION         Family History   Problem Relation Age of Onset    No Known Problems Mother     No Known Problems Father     Breast cancer Paternal Grandmother      I have reviewed and agree with the history as documented      Social History   Substance Use Topics    Smoking status: Never Smoker    Smokeless tobacco: Not on file    Alcohol use No      Comment: Per [employer]scripts; Social use        Review of Systems   Constitutional: Negative for activity change, appetite change and fever  Respiratory: Negative for chest tightness and shortness of breath  Gastrointestinal: Positive for abdominal pain, diarrhea, nausea and vomiting  Musculoskeletal: Negative for back pain and neck pain  Skin: Negative for color change and rash  Neurological: Negative for dizziness and weakness  All other systems reviewed and are negative  Physical Exam  Physical Exam   Constitutional: She is oriented to person, place, and time  She appears well-developed and well-nourished  No distress  HENT:   Head: Normocephalic  Mouth/Throat: Oropharynx is clear and moist    Cardiovascular: Regular rhythm  Mild tachycardia  Abdominal: Soft  She exhibits no distension  There is no tenderness  Musculoskeletal: Normal range of motion  She exhibits no edema  Neurological: She is alert and oriented to person, place, and time  Skin: Skin is warm and dry  Psychiatric: She has a normal mood and affect  Her behavior is normal    Nursing note and vitals reviewed        Vital Signs  ED Triage Vitals [01/01/19 2104]   Temperature Pulse Respirations Blood Pressure SpO2   99 2 °F (37 3 °C) (!) 119 18 149/75 98 %      Temp Source Heart Rate Source Patient Position - Orthostatic VS BP Location FiO2 (%)   Oral Monitor Sitting Right arm --      Pain Score       5           Vitals:    01/01/19 2104 01/01/19 2307   BP: 149/75 116/57   Pulse: (!) 119 62   Patient Position - Orthostatic VS: Sitting Lying       Visual Acuity      ED Medications  Medications   ondansetron (ZOFRAN) injection 4 mg (4 mg Intravenous Given 1/1/19 2223)   sodium chloride 0 9 % bolus 1,000 mL (0 mL Intravenous Stopped 1/1/19 2321)   ketorolac (TORADOL) injection 15 mg (15 mg Intravenous Given 1/1/19 2224)   ciprofloxacin (CIPRO) tablet 500 mg (500 mg Oral Given 1/1/19 2300) Diagnostic Studies  Results Reviewed     Procedure Component Value Units Date/Time    Comprehensive metabolic panel [[id number]]  (Abnormal) Collected:  01/01/19 2221    Lab Status:  Final result Specimen:  Blood from Arm, Right Updated:  01/01/19 2245     Sodium 138 mmol/L      Potassium 3 8 mmol/L      Chloride 103 mmol/L      CO2 26 mmol/L      ANION GAP 9 mmol/L      BUN 12 mg/dL      Creatinine 0 75 mg/dL      Glucose 100 mg/dL      Calcium 9 4 mg/dL      AST 47 (H) U/L      ALT 43 U/L      Alkaline Phosphatase 65 U/L      Total Protein 7 9 g/dL      Albumin 4 1 g/dL      Total Bilirubin 0 50 mg/dL      eGFR 113 ml/min/1 73sq m     Narrative:         National Kidney Disease Education Program recommendations are as follows:  GFR calculation is accurate only with a steady state creatinine  Chronic Kidney disease less than 60 ml/min/1 73 sq  meters  Kidney failure less than 15 ml/min/1 73 sq  meters  Lipase [[id number]]  (Normal) Collected:  01/01/19 2221    Lab Status:  Final result Specimen:  Blood from Arm, Right Updated:  01/01/19 2245     Lipase 98 u/L     POCT pregnancy, urine [[id number]]  (Normal) Resulted:  01/01/19 2215    Lab Status:  Final result Updated:  01/01/19 2215     EXT PREG TEST UR (Ref: Negative) negative                 No orders to display              Procedures  Procedures       Phone Contacts  ED Phone Contact    ED Course  ED Course as of Jan 16 0035   Tue Jan 01, 2019   2308 Patient feels better  Tolerating some oral fluids here  Plan discharge home  MDM  Number of Diagnoses or Management Options  Gastroenteritis: new and requires workup  Diagnosis management comments: 31-year-old female return from [facility] today presented with nausea, vomiting, diarrhea which she thinks may be associated to bed chicken that she day prior  Labs are relatively unremarkable  Mildly tachycardic on arrival which improved with IV fluids    Will treat for possible bacterial food-borne illness  Amount and/or Complexity of Data Reviewed  Clinical lab tests: ordered and reviewed    Patient Progress  Patient progress: improved    CritCare Time    Disposition  Final diagnoses:   Gastroenteritis     Time reflects when diagnosis was documented in both MDM as applicable and the Disposition within this note     Time User Action Codes Description Comment    1/1/2019 11:09 PM Radha Natarajan Add [K52 9] Gastroenteritis       ED Disposition     ED Disposition Condition Comment    Discharge  Dana Mcburney discharge to home/self care  Condition at discharge: Good        Follow-up Information     Follow up With Specialties Details Why Contact Info Additional Information    Gerald Gaviria MD Family Medicine   [address]  [facility] [phone]       [facility] [address] Emergency Department Emergency Medicine  If symptoms worsen [address] [facility] [address] [phone] AN ED,  [address], [place], [place], [address]          Discharge Medication List as of 1/1/2019 11:11 PM      START taking these medications    Details   ciprofloxacin (CIPRO) 500 mg tablet Take 1 tablet (500 mg total) by mouth 2 (two) times a day for 3 days, Starting Tue 1/1/2019, Until Fri 1/4/2019, Print      ondansetron (ZOFRAN-ODT) 4 mg disintegrating tablet Take 1 tablet (4 mg total) by mouth every 6 (six) hours as needed for nausea or vomiting, Starting Tue 1/1/2019, Print         CONTINUE these medications which have NOT CHANGED    Details   etonogestrel (NEXPLANON) subdermal implant Inject under the skin, Historical Med           No discharge procedures on file      ED Provider  Electronically Signed by           Betty Jara MD  01/16/19 2484

## 2019-01-02 NOTE — DISCHARGE INSTRUCTIONS
Enteritis   WHAT YOU NEED TO KNOW:   Enteritis is inflammation of the small intestine  It may be caused by eating foods or drinking liquids contaminated with a virus, bacteria, or parasites  It may also be caused by certain medicines, damage from radiation, and medical conditions such as Crohn disease  DISCHARGE INSTRUCTIONS:   Return to the emergency department if:   · You cannot stop vomiting  · You have not urinated for 12 hours  Contact your healthcare provider if:   · You have a fever over 101 5  · You have blood or mucus in your bowel movements  · You continue to vomit or have diarrhea for more than 3 days, even after treatment  · You have a dry mouth and eyes, you are urinating less than usual, and you feel dizzy when you stand up  · Your mouth or eyes are dry  You are not urinating as much or as often  · You are losing weight without trying  · You have questions or concerns about your condition or care  Medicines:   · Medicines  may be given to fight an infection caused by bacteria or a parasite  You may also need medicines to slow or stop your diarrhea or vomiting  Do not take these medicines unless your healthcare provider say it is okay  Other medicines may be needed to treat medical conditions that are causing enteritis  · Take your medicine as directed  Contact your healthcare provider if you think your medicine is not helping or if you have side effects  Tell him of her if you are allergic to any medicine  Keep a list of the medicines, vitamins, and herbs you take  Include the amounts, and when and why you take them  Bring the list or the pill bottles to follow-up visits  Carry your medicine list with you in case of an emergency  Manage enteritis:   · Eat foods that help to decrease symptoms  Limit or avoid foods and liquids that are high in sugar, fat, and fiber to help relieve diarrhea  It may be helpful to avoid lactose   Lactose is a type of sugar that is found in milk products  You may be able to tolerate soups, broths, well-cooked vegetables, canned fruit, and baked or broiled meats  Ask your dietitian or healthcare provider if you should follow a special diet  You may need to avoid other foods if you have certain medical conditions such as celiac disease  · Drink liquids as directed  Ask how much liquid to drink each day and which liquids are best for you  It is important to prevent or treat dehydration  Even if you have been vomiting, suck on ice chips or take small sips of clear liquids often  Slowly increase the amount of clear liquids you drink  If you become dehydrated, you may need IV liquids  · Drink an oral rehydration solution (ORS) as directed  An ORS contains water, salts, and sugar that are needed to replace lost body fluids  Ask what kind of ORS to use, how much to drink, and where to get it  Prevent enteritis:  Enteritis that is caused by bacteria, parasites, or viruses can be prevented  The following may help to prevent this type of enteritis:  · Wash your hands often  Use soap and water  Wash your hands after you use the bathroom, change a child's diapers, or sneeze  Wash your hands before you prepare or eat food  · Clean surfaces and do laundry often  Wash your clothes and towels separately from the rest of the laundry  Clean surfaces in your home with antibacterial  or bleach  · Clean food thoroughly and cook safely  Wash raw vegetables before you cook  Cook meat, fish, and eggs fully  Do not use the same dishes for raw meat as you do for other foods  Refrigerate any leftover food immediately  · Be aware when you camp or travel  Drink only clean water  Do not drink from rivers or lakes unless you purify or boil the water first  When you travel, drink bottled water and do not add ice  Do not eat fruit that has not been peeled  Do not eat raw fish or meat that is not fully cooked    Follow up with your healthcare provider as directed:  Write down your questions so you remember to ask them during your visits  © 2017 [address] [employer] Information is for End User's use only and may not be sold, redistributed or otherwise used for commercial purposes  All illustrations and images included in CareNotes® are the copyrighted property of [employer]  or Ananda Mancilla  The above information is an  only  It is not intended as medical advice for individual conditions or treatments  Talk to your doctor, nurse or pharmacist before following any medical regimen to see if it is safe and effective for you

## 2019-01-03 ENCOUNTER — TELEPHONE (OUTPATIENT)
Dept: OBGYN CLINIC | Facility: CLINIC | Age: 24
End: 2019-01-03

## 2019-01-03 ENCOUNTER — OFFICE VISIT (OUTPATIENT)
Dept: URGENT CARE | Facility: MEDICAL CENTER | Age: 24
End: 2019-01-03
Payer: COMMERCIAL

## 2019-01-03 ENCOUNTER — APPOINTMENT (OUTPATIENT)
Dept: RADIOLOGY | Facility: MEDICAL CENTER | Age: 24
End: 2019-01-03
Attending: PHYSICIAN ASSISTANT
Payer: COMMERCIAL

## 2019-01-03 VITALS
WEIGHT: 146 LBS | TEMPERATURE: 99.1 F | HEART RATE: 113 BPM | OXYGEN SATURATION: 99 % | BODY MASS INDEX: 24.32 KG/M2 | HEIGHT: 65 IN | DIASTOLIC BLOOD PRESSURE: 74 MMHG | SYSTOLIC BLOOD PRESSURE: 118 MMHG | RESPIRATION RATE: 18 BRPM

## 2019-01-03 DIAGNOSIS — J11.1 INFLUENZA: Primary | ICD-10-CM

## 2019-01-03 DIAGNOSIS — R05.3 PERSISTENT COUGH: ICD-10-CM

## 2019-01-03 PROCEDURE — 87631 RESP VIRUS 3-5 TARGETS: CPT | Performed by: PHYSICIAN ASSISTANT

## 2019-01-03 PROCEDURE — 71046 X-RAY EXAM CHEST 2 VIEWS: CPT

## 2019-01-03 PROCEDURE — 99213 OFFICE O/P EST LOW 20 MIN: CPT | Performed by: PHYSICIAN ASSISTANT

## 2019-01-03 RX ORDER — OSELTAMIVIR PHOSPHATE 75 MG/1
75 CAPSULE ORAL EVERY 12 HOURS SCHEDULED
Qty: 10 CAPSULE | Refills: 0 | Status: SHIPPED | OUTPATIENT
Start: 2019-01-03 | End: 2019-01-08

## 2019-01-03 NOTE — PATIENT INSTRUCTIONS
1  Increase fluids  2  Take Tamiflu 75mg  1 tablet twice daily x 5 days  3   Follow up with PCP in 3-5 days if symptoms persist

## 2019-01-03 NOTE — TELEPHONE ENCOUNTER
Pt's mother called making us aware pt may not be able to make appt for tomorrow due to feeling sick  Will call in the morning to let us know if she will need to cancel the appt  Also made Rito Lisa (mother) aware that her daughters nexplanon is good for a full 3 days till March 15th exactly  Made aware to use secondary protection for the full month of march if appt tomorrow is to be cancelled

## 2019-01-03 NOTE — PROGRESS NOTES
[facility]        NAME: Dayna Tuttle is a 21 y o  female  : 1995    MRN: [id number]  DATE: January 3, 2019  TIME: 6:53 PM    Assessment and Plan   Influenza [J11 1]  1  Influenza  INFLUENZA A/B AND RSV, PCR    CANCELED: INFLUENZA A/B AND RSV, PCR   2  Persistent cough  XR chest pa & lateral         Patient Instructions     1  Increase fluids  2  Take Tamiflu 75mg  1 tablet twice daily x 5 days  3  Follow up with PCP in 3-5 days if symptoms persist       Chief Complaint     Chief Complaint   Patient presents with    Cough     x3 days with a productive cough , wheezing and sob     Fever     x3 days wtih mild fevers (highest 99 6F)    Vomiting     x3 days N/V/D  with mild abd pain         History of Present Illness       The patient is a 27-year-old female presents with a 3 day history of acute onset nausea, vomiting, diarrhea, chills, body aches and persistent cough  She did report a fever at the onset of her symptoms but the cough has become more progressive over the past 24 hours  Patient denies any chest pain or shortness of breath  Review of Systems   Review of Systems   Constitutional: Positive for chills, fatigue and fever  HENT: Positive for congestion and postnasal drip  Respiratory: Positive for cough  Gastrointestinal: Positive for diarrhea, nausea and vomiting           Current Medications       Current Outpatient Prescriptions:     albuterol 2 mg/5 mL syrup, Take 2 mg by mouth 3 (three) times a day, Disp: , Rfl:     etonogestrel (NEXPLANON) subdermal implant, Inject under the skin, Disp: , Rfl:     ondansetron (ZOFRAN-ODT) 4 mg disintegrating tablet, Take 1 tablet (4 mg total) by mouth every 6 (six) hours as needed for nausea or vomiting, Disp: 10 tablet, Rfl: 0    ciprofloxacin (CIPRO) 500 mg tablet, Take 1 tablet (500 mg total) by mouth 2 (two) times a day for 3 days (Patient not taking: Reported on 1/3/2019 ), Disp: 6 tablet, Rfl: 0    Current Allergies Allergies as of 01/03/2019 - Reviewed 01/03/2019   Allergen Reaction Noted    Delsym cough-cold daytime [phenylephrine-dm-gg-apap]  07/13/2016    Dextromethorphan      Robitussin cough-cold d [pseudoephedrine-dm-gg]  07/13/2016    Guaifenesin Rash             The following portions of the patient's history were reviewed and updated as appropriate: allergies, current medications, past family history, past medical history, past social history, past surgical history and problem list      Past Medical History:   Diagnosis Date    Allergic     Asthma     Breast lump     Last assessed 2/16/2011    Tonsillitis        Past Surgical History:   Procedure Laterality Date    [place] REMOVE TONSILS/ADENOIDS,12+ Y/O N/A 7/13/2016    Procedure: tonsillectomy;  Surgeon: Pam Gonzalez MD;  [facility];  Service: ENT    WISDOM TOOTH EXTRACTION         Family History   Problem Relation Age of Onset    No Known Problems Mother     No Known Problems Father     Breast cancer Paternal Grandmother          Medications have been verified  Objective   /74   Pulse (!) 113   Temp 99 1 °F (37 3 °C) (Temporal)   Resp 18   Ht 5' 5" (1 651 m)   Wt 66 2 kg (146 lb)   SpO2 99%   BMI 24 30 kg/m²        Physical Exam     Physical Exam   Constitutional: She appears well-developed and well-nourished  No distress  HENT:   Head: Normocephalic and atraumatic  Right Ear: Tympanic membrane and ear canal normal    Left Ear: Tympanic membrane and ear canal normal    Nose: Rhinorrhea present  Mouth/Throat: Uvula is midline, oropharynx is clear and moist and mucous membranes are normal    Cardiovascular: Normal rate, regular rhythm and normal heart sounds  No murmur heard    Pulmonary/Chest: Effort normal and breath sounds normal

## 2019-01-04 ENCOUNTER — TELEPHONE (OUTPATIENT)
Dept: OBGYN CLINIC | Facility: CLINIC | Age: 24
End: 2019-01-04

## 2019-01-04 LAB
FLUAV AG SPEC QL: ABNORMAL
FLUBV AG SPEC QL: ABNORMAL
RSV B RNA SPEC QL NAA+PROBE: DETECTED

## 2019-01-04 NOTE — TELEPHONE ENCOUNTER
PC to patient left message that I am trying to reach her regarding upcoming appt  May try again or may try to discuss at time of appt

## 2019-01-07 ENCOUNTER — PROCEDURE VISIT (OUTPATIENT)
Dept: OBGYN CLINIC | Facility: CLINIC | Age: 24
End: 2019-01-07
Payer: COMMERCIAL

## 2019-01-07 VITALS
WEIGHT: 140 LBS | BODY MASS INDEX: 23.32 KG/M2 | SYSTOLIC BLOOD PRESSURE: 126 MMHG | HEIGHT: 65 IN | DIASTOLIC BLOOD PRESSURE: 82 MMHG

## 2019-01-07 DIAGNOSIS — Z30.46 NEXPLANON REMOVAL: ICD-10-CM

## 2019-01-07 DIAGNOSIS — Z30.017 INSERTION OF NEXPLANON: Primary | ICD-10-CM

## 2019-01-07 PROCEDURE — 11983 REMOVE/INSERT DRUG IMPLANT: CPT | Performed by: PHYSICIAN ASSISTANT

## 2019-01-07 NOTE — PROGRESS NOTES
Remove and insert drug implant  Date/Time: 1/7/2019 8:33 AM  Performed by: [facility]  Authorized by: [facility]     Consent:     Consent obtained:  Verbal and written    Consent given by:  Patient    Procedural risks discussed:  Bleeding, damage to other organs, death, failure rate, infection, possible continued pain, possible conversion to open surgery, possible loss of function and repeat procedure    Patient questions answered: yes    Indication:     Indication: Presence of non-biodegradable drug delivery implant    Pre-procedure:     Local anesthetic:  Xylocaine with epinephrine  Procedure:     Procedure:  Removal with reinsertion    Small stab incision was made in arm: yes      Left/right:  Left    Preloaded Implanon trocar was placed subdermally: yes      Visualization of implant was obtained: yes      Implanon was inserted and trocar removed: yes      Palpitation confirms placement by provider and patient: yes      Site was closed with steri-strips and pressure bandage applied: yes    Comments:      Pt for removal and reinsertion of Neplanon  Did well previously w Irwinjacqu[facility] Dc and desires usage again  Pt had initial Nexplanon inserted 3/2016  Pt tolerated procedure well  She wll plan to keep on eye on her vaginitis sx and if they are still as frequent may consider d/c ing NExplanon and trial Nuva ring

## 2019-01-11 ENCOUNTER — TELEPHONE (OUTPATIENT)
Dept: OBGYN CLINIC | Facility: CLINIC | Age: 24
End: 2019-01-11

## 2019-01-11 NOTE — TELEPHONE ENCOUNTER
Pt's mother called explaning that at her last visit Maykel Bobo suggested starting pt on a low dose yeast medication  Pt would like to start that up

## 2019-01-12 NOTE — TELEPHONE ENCOUNTER
If shes not already trying it she can try taking an acidophilus supplement every day to minimize her yeast sx  If she has tried that and is currently having sx we can send in rx for Diflucan 150 mg x 1  Thanks!

## 2019-01-14 DIAGNOSIS — B37.3 VAGINAL YEAST INFECTION: Primary | ICD-10-CM

## 2019-01-14 DIAGNOSIS — N92.6 IRREGULAR MENSES: ICD-10-CM

## 2019-01-14 RX ORDER — FLUCONAZOLE 150 MG/1
150 TABLET ORAL ONCE
Qty: 1 TABLET | Refills: 0 | Status: SHIPPED | OUTPATIENT
Start: 2019-01-14 | End: 2020-04-24 | Stop reason: SDUPTHER

## 2019-01-14 RX ORDER — NORETHINDRONE ACETATE AND ETHINYL ESTRADIOL 1MG-20(21)
1 KIT ORAL DAILY
Qty: 28 TABLET | Refills: 0 | Status: SHIPPED | OUTPATIENT
Start: 2019-01-14 | End: 2019-02-09 | Stop reason: SDUPTHER

## 2019-01-14 NOTE — TELEPHONE ENCOUNTER
Pt has started acidophilus daily last week  Advised pt to continue taking on daily basis  Also placed order for diflucan

## 2019-01-28 ENCOUNTER — TELEPHONE (OUTPATIENT)
Dept: OBGYN CLINIC | Facility: CLINIC | Age: 24
End: 2019-01-28

## 2019-01-28 DIAGNOSIS — B96.89 BACTERIAL VAGINOSIS: Primary | ICD-10-CM

## 2019-01-28 DIAGNOSIS — N76.0 BACTERIAL VAGINOSIS: Primary | ICD-10-CM

## 2019-01-28 RX ORDER — METRONIDAZOLE 7.5 MG/G
1 GEL VAGINAL 2 TIMES DAILY
Qty: 70 G | Refills: 0 | Status: SHIPPED | OUTPATIENT
Start: 2019-01-28 | End: 2019-03-22 | Stop reason: SDUPTHER

## 2019-01-28 NOTE — TELEPHONE ENCOUNTER
Patients mom called patient at school in [place] and c/o vaginal odor and is requesting a script for metronitizole vaginal cream called into the local pharmacy and mom will mail it to the patient at school    MM CMA

## 2019-02-09 DIAGNOSIS — N92.6 IRREGULAR MENSES: ICD-10-CM

## 2019-02-11 RX ORDER — NORETHINDRONE ACETATE AND ETHINYL ESTRADIOL AND FERROUS FUMARATE 1MG-20(21)
KIT ORAL
Qty: 28 TABLET | Refills: 0 | Status: SHIPPED | OUTPATIENT
Start: 2019-02-11 | End: 2019-11-27

## 2019-02-13 DIAGNOSIS — N92.6 IRREGULAR MENSES: ICD-10-CM

## 2019-02-15 RX ORDER — NORETHINDRONE ACETATE AND ETHINYL ESTRADIOL AND FERROUS FUMARATE 1MG-20(21)
KIT ORAL
Qty: 28 TABLET | Refills: 0 | Status: SHIPPED | OUTPATIENT
Start: 2019-02-15 | End: 2019-03-16 | Stop reason: SDUPTHER

## 2019-03-16 DIAGNOSIS — N92.6 IRREGULAR MENSES: ICD-10-CM

## 2019-03-16 RX ORDER — NORETHINDRONE ACETATE AND ETHINYL ESTRADIOL AND FERROUS FUMARATE 1MG-20(21)
KIT ORAL
Qty: 28 TABLET | Refills: 0 | Status: SHIPPED | OUTPATIENT
Start: 2019-03-16 | End: 2019-05-04 | Stop reason: SDUPTHER

## 2019-03-22 DIAGNOSIS — N76.0 BACTERIAL VAGINOSIS: ICD-10-CM

## 2019-03-22 DIAGNOSIS — B96.89 BACTERIAL VAGINOSIS: ICD-10-CM

## 2019-03-22 RX ORDER — METRONIDAZOLE 7.5 MG/G
1 GEL VAGINAL 2 TIMES DAILY
Qty: 70 G | Refills: 0 | Status: SHIPPED | OUTPATIENT
Start: 2019-03-22 | End: 2019-11-27 | Stop reason: ALTCHOICE

## 2019-04-25 ENCOUNTER — TELEPHONE (OUTPATIENT)
Dept: FAMILY MEDICINE CLINIC | Facility: CLINIC | Age: 24
End: 2019-04-25

## 2019-05-04 DIAGNOSIS — N92.6 IRREGULAR MENSES: ICD-10-CM

## 2019-05-07 RX ORDER — NORETHINDRONE ACETATE AND ETHINYL ESTRADIOL AND FERROUS FUMARATE 1MG-20(21)
KIT ORAL
Qty: 28 TABLET | Refills: 0 | Status: SHIPPED | OUTPATIENT
Start: 2019-05-07 | End: 2019-07-13 | Stop reason: SDUPTHER

## 2019-07-13 DIAGNOSIS — N92.6 IRREGULAR MENSES: ICD-10-CM

## 2019-07-13 RX ORDER — NORETHINDRONE ACETATE AND ETHINYL ESTRADIOL AND FERROUS FUMARATE 1MG-20(21)
KIT ORAL
Qty: 28 TABLET | Refills: 0 | Status: SHIPPED | OUTPATIENT
Start: 2019-07-13 | End: 2019-11-27

## 2019-11-27 ENCOUNTER — OFFICE VISIT (OUTPATIENT)
Dept: FAMILY MEDICINE CLINIC | Facility: CLINIC | Age: 24
End: 2019-11-27
Payer: COMMERCIAL

## 2019-11-27 VITALS
RESPIRATION RATE: 18 BRPM | DIASTOLIC BLOOD PRESSURE: 80 MMHG | OXYGEN SATURATION: 97 % | BODY MASS INDEX: 22.46 KG/M2 | HEART RATE: 92 BPM | WEIGHT: 134.8 LBS | SYSTOLIC BLOOD PRESSURE: 120 MMHG | HEIGHT: 65 IN

## 2019-11-27 DIAGNOSIS — F41.9 ANXIETY: ICD-10-CM

## 2019-11-27 DIAGNOSIS — Z23 FLU VACCINE NEED: ICD-10-CM

## 2019-11-27 DIAGNOSIS — Z00.00 ANNUAL PHYSICAL EXAM: Primary | ICD-10-CM

## 2019-11-27 PROCEDURE — 90471 IMMUNIZATION ADMIN: CPT | Performed by: NURSE PRACTITIONER

## 2019-11-27 PROCEDURE — 99395 PREV VISIT EST AGE 18-39: CPT | Performed by: NURSE PRACTITIONER

## 2019-11-27 PROCEDURE — 90686 IIV4 VACC NO PRSV 0.5 ML IM: CPT | Performed by: NURSE PRACTITIONER

## 2019-11-27 RX ORDER — CLONAZEPAM 0.5 MG/1
TABLET ORAL
Refills: 0 | COMMUNITY
Start: 2019-10-07 | End: 2021-12-23 | Stop reason: ALTCHOICE

## 2019-11-27 RX ORDER — LORAZEPAM 0.5 MG/1
TABLET ORAL
Refills: 0 | COMMUNITY
Start: 2019-10-24

## 2019-11-27 RX ORDER — DEXTROAMPHETAMINE SACCHARATE, AMPHETAMINE ASPARTATE, DEXTROAMPHETAMINE SULFATE AND AMPHETAMINE SULFATE 7.5; 7.5; 7.5; 7.5 MG/1; MG/1; MG/1; MG/1
TABLET ORAL
Refills: 0 | COMMUNITY
Start: 2019-11-13

## 2019-11-27 RX ORDER — PROPRANOLOL HYDROCHLORIDE 10 MG/1
TABLET ORAL
Refills: 0 | COMMUNITY
Start: 2019-10-24

## 2019-11-27 RX ORDER — BUPROPION HYDROCHLORIDE 150 MG/1
TABLET ORAL
Refills: 0 | COMMUNITY
Start: 2019-11-13

## 2019-11-27 NOTE — PROGRESS NOTES
Zander PARRA    NAME: Rene Kauffman  AGE: 25 y o  SEX: female  : 1995     DATE: 2019     Assessment and Plan:     Problem List Items Addressed This Visit        Other    Anxiety    Annual physical exam - Primary          Immunizations and preventive care screenings were discussed with patient today  Appropriate education was printed on patient's after visit summary  Counseling:  Alcohol/drug use: discussed moderation in alcohol intake, the recommendations for healthy alcohol use, and avoidance of illicit drug use  Dental Health: discussed importance of regular tooth brushing, flossing, and dental visits  Sexual health: discussed sexually transmitted diseases and use of condoms  · Exercise: the importance of regular exercise/physical activity was discussed  Recommend exercise 3-5 times per week for at least 30 minutes  Patient refuses lab work  Patient reports that she got lab work done recently for her psychiatrist  Patient encouraged to continue to follow-up with her psychiatrist      Return in 1 year (on 2020)  Chief Complaint:     Chief Complaint   Patient presents with    Annual Exam      History of Present Illness:     Adult Annual Physical   Patient here for a comprehensive physical exam  The patient reports no problems  Diet and Physical Activity  · Diet/Nutrition: well balanced diet  · Exercise: no formal exercise  Depression Screening  PHQ-9 Depression Screening    PHQ-9:    Frequency of the following problems over the past two weeks:       Little interest or pleasure in doing things:  0 - not at all  Feeling down, depressed, or hopeless:  0 - not at all  PHQ-2 Score:  0       General Health  · Sleep: sleeps well and gets 7-8 hours of sleep on average  · Hearing: normal - bilateral   · Vision: goes for regular eye exams and wears glasses     · Dental: regular dental visits and brushes teeth twice daily  /GYN Health  · Last menstrual period: 10/25/19  · Contraceptive method: nexplanon  · History of STDs?: no     Patient follows up with a psychiatrist for anxiety  Patient reports that she takes her medications as prescribed  Patient reports that she is doing well  Denies any depression or suicidal thoughts  Review of Systems:     Review of Systems   Constitutional: Negative for appetite change, chills, fatigue and fever  HENT: Negative for congestion, ear pain, sore throat and trouble swallowing  Eyes: Negative for pain, discharge and redness  Respiratory: Negative for cough, chest tightness, shortness of breath and wheezing  Cardiovascular: Negative for chest pain, palpitations and leg swelling  Gastrointestinal: Negative for abdominal pain, blood in stool, diarrhea, nausea and vomiting  Genitourinary: Negative for dysuria, frequency, hematuria, urgency and vaginal discharge  Musculoskeletal: Negative for arthralgias, myalgias and neck pain  Skin: Negative for rash  Neurological: Negative for dizziness, seizures, syncope, light-headedness, numbness and headaches  Psychiatric/Behavioral: Negative for suicidal ideas  As noted in HPI         Past Medical History:     Past Medical History:   Diagnosis Date    Allergic     Asthma     Breast lump     Last assessed 2/16/2011    Tonsillitis       Past Surgical History:     Past Surgical History:   Procedure Laterality Date    [place] REMOVE TONSILS/ADENOIDS,12+ Y/O N/A 7/13/2016    Procedure: tonsillectomy;  Surgeon: Lobo Menchaca MD;  [facility];  Service: ENT    WISDOM TOOTH EXTRACTION        Social History:     Social History     Socioeconomic History    Marital status: Single     Spouse name: None    Number of children: None    Years of education: None    Highest education level: None   Occupational History    None   Social Needs    Financial resource strain: None    Food insecurity: Worry: None     Inability: None    Transportation needs:     Medical: None     Non-medical: None   Tobacco Use    Smoking status: Never Smoker    Smokeless tobacco: Never Used   Substance and Sexual Activity    Alcohol use: Yes     Comment: social alcohol use    Drug use: No    Sexual activity: Yes     Partners: Male     Birth control/protection: IUD     Comment: Nexplanon in place   Lifestyle    Physical activity:     Days per week: None     Minutes per session: None    Stress: None   Relationships    Social connections:     Talks on phone: None     Gets together: None     Attends Confucianism service: None     Active member of club or organization: None     Attends meetings of clubs or organizations: None     Relationship status: None    Intimate partner violence:     Fear of current or ex partner: None     Emotionally abused: None     Physically abused: None     Forced sexual activity: None   Other Topics Concern    None   Social History Narrative    None      Family History:     Family History   Problem Relation Age of Onset    No Known Problems Mother     No Known Problems Father     Breast cancer Paternal Grandmother       Current Medications:     Current Outpatient Medications   Medication Sig Dispense Refill    albuterol 2 mg/5 mL syrup Take 2 mg by mouth 3 (three) times a day      amphetamine-dextroamphetamine (ADDERALL) 30 MG tablet TK 1/2 T PO BID  0    buPROPion (WELLBUTRIN XL) 150 mg 24 hr tablet TK 1 T PO QAM  0    clonazePAM (KlonoPIN) 0 5 mg tablet TK 1/2 TO 1 T PO BID PRA  0    etonogestrel (NEXPLANON) subdermal implant Inject under the skin      LORazepam (ATIVAN) 0 5 mg tablet TK 1/2 TO 1 T PO ONCE A DAY PRN  0    propranolol (INDERAL) 10 mg tablet TK 1 TO 2 TS PO BID PRA  0    sertraline (ZOLOFT) 50 mg tablet        No current facility-administered medications for this visit  Allergies:      Allergies   Allergen Reactions    Delsym Cough-Cold Daytime [Phenylephrine-Dm-Gg-Apap]     Dextromethorphan     Robitussin Cough-Cold D [Pseudoephedrine-Dm-Gg]     Guaifenesin Rash      Physical Exam:     /80   Pulse 92   Resp 18   Ht 5' 5" (1 651 m)   Wt 61 1 kg (134 lb 12 8 oz)   SpO2 97%   BMI 22 43 kg/m²     Physical Exam   Constitutional: She is oriented to person, place, and time  No distress  Patient appears well  HENT:   Right Ear: External ear normal    Left Ear: External ear normal    Nose: Nose normal    Mouth/Throat: Oropharynx is clear and moist    Tympanic membranes and ear canals wnl  Posterior pharynx wnl  Eyes: Pupils are equal, round, and reactive to light  Conjunctivae are normal  Right eye exhibits no discharge  Left eye exhibits no discharge  Neck: Normal range of motion  No thyromegaly present  Cardiovascular: Normal rate, regular rhythm, normal heart sounds and intact distal pulses  No edema noted  Pulmonary/Chest: Effort normal and breath sounds normal  She has no wheezes  Abdominal: Soft  She exhibits no mass  There is no tenderness  Musculoskeletal: Normal range of motion  Gait wnl  Lymphadenopathy:     She has no cervical adenopathy  Neurological: She is alert and oriented to person, place, and time  No cranial nerve deficit  Coordination normal    Skin: No rash noted  Psychiatric: She has a normal mood and affect  Vitals reviewed        [address] [place] [address]

## 2019-11-27 NOTE — PATIENT INSTRUCTIONS

## 2019-12-17 NOTE — PROGRESS NOTES
Assessment/Plan   Diagnoses and all orders for this visit:    Well woman exam        Discussion    All questions have been answered to her satisfaction  RTO for APE or sooner if needed    Subjective     Pt for annual GYN exam  Using NExplaon for [facility]  Gets cycles irregularly but are still heavy, although not as heavy as prior to usage  Pt does still feel like she is getting increased yeast on the Nexplanon, although she is not good at taking acidophilus  Just over an infection, used OTC  tx earlier this week w relief of sx  I r/w pt other options for [facility]  Pt is considering Nuva ring  I did r/w pt that she may see higher rates of vaginitis with that as well  At this point pt has decided to try to be more faithful about taking the acidophilus  If she is doing this regularly and still seeing an increased rate of yeast will plan Nexplanon removal and trial of a different BC  Pt does desire STD work up  Monogamous  Cande Cabrales is a 25 y o  female who presents for annual well woman exam    Menarche -15 ; LMP - 10/2019  (+) SBEs - no breast masses, asymmetry, nipple discharge or bleeding, changes in skin of breast, or breast tenderness bilaterally  No abd/pelvic pain or HAs;   Pt is sexually active in a mutually monog sexual relationship x 3 years; No issues with intercourse; She desires std work up but declines hiv/hep testing; Feels safe at home  Current contraception: Nexplanon   Condom use:yes   Gardasil - had series  (+) PCP for routine Bw/care; Last Pap - 12/17/18 WNL   History of abnormal Pap smear:   Last STD screen - 12/18 WNL     Review of Systems   Constitutional: Negative  Respiratory: Negative  Gastrointestinal: Negative  Endocrine: Negative  Genitourinary: Negative          The following portions of the patient's history were reviewed and updated as appropriate: allergies, current medications, past family history, past medical history, past social history, past surgical history and problem list          OB History        0    Para   0    Term   0       0    AB   0    Living   0       SAB   0    TAB   0    Ectopic   0    Multiple   0    Live Births   0                 Past Medical History:   Diagnosis Date    Allergic     Asthma     Breast lump     Last assessed 2011    Tonsillitis        Past Surgical History:   Procedure Laterality Date    [place] REMOVE TONSILS/ADENOIDS,12+ Y/O N/A 2016    Procedure: tonsillectomy;  Surgeon: Shaheed Stewart MD;  [facility];  Service: ENT    WISDOM TOOTH EXTRACTION         Family History   Problem Relation Age of Onset    No Known Problems Mother     No Known Problems Father     Breast cancer Paternal Grandmother        Social History     Socioeconomic History    Marital status: Single     Spouse name: Not on file    Number of children: Not on file    Years of education: Not on file    Highest education level: Not on file   Occupational History    Not on file   Social Needs    Financial resource strain: Not on file    Food insecurity:     Worry: Not on file     Inability: Not on file    Transportation needs:     Medical: Not on file     Non-medical: Not on file   Tobacco Use    Smoking status: Never Smoker    Smokeless tobacco: Never Used   Substance and Sexual Activity    Alcohol use: Yes     Comment: social alcohol use    Drug use: No    Sexual activity: Yes     Partners: Male     Birth control/protection: IUD     Comment: Nexplanon in place   Lifestyle    Physical activity:     Days per week: Not on file     Minutes per session: Not on file    Stress: Not on file   Relationships    Social connections:     Talks on phone: Not on file     Gets together: Not on file     Attends Tenriism service: Not on file     Active member of club or organization: Not on file     Attends meetings of clubs or organizations: Not on file     Relationship status: Not on file    Intimate partner violence:     Fear of current or ex partner: Not on file     Emotionally abused: Not on file     Physically abused: Not on file     Forced sexual activity: Not on file   Other Topics Concern    Not on file   Social History Narrative    Not on file         Current Outpatient Medications:     amphetamine-dextroamphetamine (ADDERALL) 30 MG tablet, TK 1/2 T PO BID, Disp: , Rfl: 0    buPROPion (WELLBUTRIN XL) 150 mg 24 hr tablet, TK 1 T PO QAM, Disp: , Rfl: 0    clonazePAM (KlonoPIN) 0 5 mg tablet, TK 1/2 TO 1 T PO BID PRA, Disp: , Rfl: 0    etonogestrel (NEXPLANON) subdermal implant, Inject under the skin, Disp: , Rfl:     LORazepam (ATIVAN) 0 5 mg tablet, TK 1/2 TO 1 T PO ONCE A DAY PRN, Disp: , Rfl: 0    propranolol (INDERAL) 10 mg tablet, TK 1 TO 2 TS PO BID PRA, Disp: , Rfl: 0    sertraline (ZOLOFT) 50 mg tablet, , Disp: , Rfl:     Allergies   Allergen Reactions    Delsym Cough-Cold Daytime [Phenylephrine-Dm-Gg-Apap]     Dextromethorphan     Robitussin Cough-Cold D [Pseudoephedrine-Dm-Gg]     Guaifenesin Rash       Objective   Vitals:    12/18/19 1058   BP: 110/72   BP Location: Left arm   Patient Position: Sitting   Cuff Size: Standard   Weight: 61 2 kg (135 lb)   Height: 5' 5" (1 651 m)     Physical Exam   Constitutional: She is oriented to person, place, and time  She appears well-developed and well-nourished  HENT:   Head: Normocephalic and atraumatic  Cardiovascular: Normal rate and regular rhythm  Pulmonary/Chest: Effort normal and breath sounds normal  Right breast exhibits no inverted nipple, no mass, no nipple discharge, no skin change and no tenderness  Left breast exhibits no inverted nipple, no mass, no nipple discharge, no skin change and no tenderness  Breasts are symmetrical    Abdominal: Soft  She exhibits no distension and no mass  There is no rebound and no guarding  Genitourinary: Uterus normal  Vaginal discharge found     Genitourinary Comments: Slight erythema present on exam with minimal d/c that may still be c/w previous PV treatment  Neurological: She is alert and oriented to person, place, and time  Skin: Skin is warm and dry  Psychiatric: She has a normal mood and affect  Patient Instructions   Will continue w Nexplanon for [facility] for now  Will call if desires other options  STD cx's done  As pt is asx for yeast will not plan to treat currently  Will increase acidophilus and call if no resolution of sx

## 2019-12-18 ENCOUNTER — ANNUAL EXAM (OUTPATIENT)
Dept: OBGYN CLINIC | Facility: CLINIC | Age: 24
End: 2019-12-18
Payer: COMMERCIAL

## 2019-12-18 VITALS
HEIGHT: 65 IN | WEIGHT: 135 LBS | BODY MASS INDEX: 22.49 KG/M2 | SYSTOLIC BLOOD PRESSURE: 110 MMHG | DIASTOLIC BLOOD PRESSURE: 72 MMHG

## 2019-12-18 DIAGNOSIS — N76.0 ACUTE VAGINITIS: ICD-10-CM

## 2019-12-18 DIAGNOSIS — Z01.419 WELL WOMAN EXAM: Primary | ICD-10-CM

## 2019-12-18 PROCEDURE — 87661 TRICHOMONAS VAGINALIS AMPLIF: CPT | Performed by: PHYSICIAN ASSISTANT

## 2019-12-18 PROCEDURE — 87591 N.GONORRHOEAE DNA AMP PROB: CPT | Performed by: PHYSICIAN ASSISTANT

## 2019-12-18 PROCEDURE — S0612 ANNUAL GYNECOLOGICAL EXAMINA: HCPCS | Performed by: PHYSICIAN ASSISTANT

## 2019-12-18 PROCEDURE — 87491 CHLMYD TRACH DNA AMP PROBE: CPT | Performed by: PHYSICIAN ASSISTANT

## 2019-12-18 NOTE — PATIENT INSTRUCTIONS
Will continue w Nexplanon for [facility] for now  Will call if desires other options  STD cx's done  As pt is asx for yeast will not plan to treat currently  Will increase acidophilus and call if no resolution of sx

## 2019-12-19 ENCOUNTER — TELEPHONE (OUTPATIENT)
Dept: OBGYN CLINIC | Facility: CLINIC | Age: 24
End: 2019-12-19

## 2019-12-19 LAB
C TRACH DNA SPEC QL NAA+PROBE: NEGATIVE
N GONORRHOEA DNA SPEC QL NAA+PROBE: NEGATIVE

## 2019-12-19 NOTE — TELEPHONE ENCOUNTER
Lab is calling about what source was the CG/CT done for  They were not able to tell me the type of culture so I did not know if it was thin prep or swab  Please ask [address] [place] [address] or Toma Zhu to look into this tomorrow ASAP  Lab stated the specimen is okay to wait for tomorrow  Call [phone] option one   And anyone can assist

## 2019-12-20 NOTE — TELEPHONE ENCOUNTER
Called number provider  Spoke to a representative and confirmed source was cervix and it was off the thin prep

## 2019-12-23 LAB — T VAGINALIS RRNA SPEC QL NAA+PROBE: NEGATIVE

## 2020-04-24 DIAGNOSIS — Z30.41 ENCOUNTER FOR SURVEILLANCE OF CONTRACEPTIVE PILLS: ICD-10-CM

## 2020-04-24 DIAGNOSIS — B37.3 VAGINAL YEAST INFECTION: ICD-10-CM

## 2020-04-24 RX ORDER — FLUCONAZOLE 150 MG/1
150 TABLET ORAL ONCE
Qty: 1 TABLET | Refills: 0 | Status: SHIPPED | OUTPATIENT
Start: 2020-04-24 | End: 2020-04-24

## 2020-04-24 RX ORDER — NORETHINDRONE ACETATE AND ETHINYL ESTRADIOL 1; .02 MG/1; MG/1
1 TABLET ORAL DAILY
Qty: 21 TABLET | Refills: 2 | Status: SHIPPED | OUTPATIENT
Start: 2020-04-24 | End: 2021-12-23 | Stop reason: ALTCHOICE

## 2020-10-13 ENCOUNTER — TELEPHONE (OUTPATIENT)
Dept: OBGYN CLINIC | Facility: CLINIC | Age: 25
End: 2020-10-13

## 2020-12-21 ENCOUNTER — ANNUAL EXAM (OUTPATIENT)
Dept: OBGYN CLINIC | Facility: CLINIC | Age: 25
End: 2020-12-21
Payer: COMMERCIAL

## 2020-12-21 VITALS
HEIGHT: 66 IN | DIASTOLIC BLOOD PRESSURE: 80 MMHG | WEIGHT: 136.8 LBS | SYSTOLIC BLOOD PRESSURE: 132 MMHG | BODY MASS INDEX: 21.98 KG/M2

## 2020-12-21 DIAGNOSIS — Z01.419 WELL WOMAN EXAM: Primary | ICD-10-CM

## 2020-12-21 PROCEDURE — S0612 ANNUAL GYNECOLOGICAL EXAMINA: HCPCS | Performed by: PHYSICIAN ASSISTANT

## 2020-12-21 RX ORDER — SERTRALINE HYDROCHLORIDE 100 MG/1
TABLET, FILM COATED ORAL
COMMUNITY
Start: 2020-11-12 | End: 2021-12-23 | Stop reason: ALTCHOICE

## 2020-12-21 RX ORDER — CLINDAMYCIN PHOSPHATE AND BENZOYL PEROXIDE 10; 37.5 MG/G; MG/G
GEL TOPICAL
COMMUNITY
Start: 2020-10-12 | End: 2021-12-23 | Stop reason: ALTCHOICE

## 2020-12-21 RX ORDER — CLINDAMYCIN AND BENZOYL PEROXIDE 10; 50 MG/G; MG/G
GEL TOPICAL
COMMUNITY
Start: 2020-10-12 | End: 2021-12-23 | Stop reason: ALTCHOICE

## 2020-12-21 RX ORDER — DOXYCYCLINE HYCLATE 100 MG
TABLET ORAL
COMMUNITY
Start: 2020-09-13 | End: 2021-12-23 | Stop reason: ALTCHOICE

## 2020-12-21 RX ORDER — ALBUTEROL SULFATE 1.25 MG/3ML
1 SOLUTION RESPIRATORY (INHALATION) EVERY 6 HOURS PRN
COMMUNITY

## 2020-12-21 RX ORDER — MINOCYCLINE HYDROCHLORIDE 100 MG/1
TABLET ORAL
COMMUNITY
Start: 2020-10-12 | End: 2021-12-23 | Stop reason: ALTCHOICE

## 2021-12-23 ENCOUNTER — ANNUAL EXAM (OUTPATIENT)
Dept: OBGYN CLINIC | Facility: CLINIC | Age: 26
End: 2021-12-23
Payer: COMMERCIAL

## 2021-12-23 VITALS
HEIGHT: 66 IN | DIASTOLIC BLOOD PRESSURE: 80 MMHG | SYSTOLIC BLOOD PRESSURE: 120 MMHG | WEIGHT: 145 LBS | BODY MASS INDEX: 23.3 KG/M2

## 2021-12-23 DIAGNOSIS — Z30.09 COUNSELING FOR INITIATION OF BIRTH CONTROL METHOD: ICD-10-CM

## 2021-12-23 DIAGNOSIS — Z01.419 ENCOUNTER FOR GYNECOLOGICAL EXAMINATION (GENERAL) (ROUTINE) WITHOUT ABNORMAL FINDINGS: Primary | ICD-10-CM

## 2021-12-23 PROCEDURE — G0145 SCR C/V CYTO,THINLAYER,RESCR: HCPCS | Performed by: PHYSICIAN ASSISTANT

## 2021-12-23 PROCEDURE — S0612 ANNUAL GYNECOLOGICAL EXAMINA: HCPCS | Performed by: PHYSICIAN ASSISTANT

## 2021-12-27 ENCOUNTER — PROCEDURE VISIT (OUTPATIENT)
Dept: OBGYN CLINIC | Facility: CLINIC | Age: 26
End: 2021-12-27

## 2021-12-27 VITALS
WEIGHT: 147.6 LBS | HEIGHT: 66 IN | SYSTOLIC BLOOD PRESSURE: 112 MMHG | BODY MASS INDEX: 23.72 KG/M2 | DIASTOLIC BLOOD PRESSURE: 82 MMHG

## 2021-12-27 DIAGNOSIS — Z30.09 ENCOUNTER FOR OTHER GENERAL COUNSELING OR ADVICE ON CONTRACEPTION: ICD-10-CM

## 2021-12-27 DIAGNOSIS — Z30.46 ENCOUNTER FOR REMOVAL OF SUBDERMAL CONTRACEPTIVE IMPLANT: Primary | ICD-10-CM

## 2021-12-27 RX ORDER — ETONOGESTREL AND ETHINYL ESTRADIOL 11.7; 2.7 MG/1; MG/1
INSERT, EXTENDED RELEASE VAGINAL
Qty: 1 EACH | Refills: 1 | Status: SHIPPED | OUTPATIENT
Start: 2021-12-27 | End: 2022-02-15 | Stop reason: ALTCHOICE

## 2022-01-06 LAB
LAB AP GYN PRIMARY INTERPRETATION: NORMAL
LAB AP LMP: NORMAL
Lab: NORMAL

## 2022-01-17 ENCOUNTER — TELEPHONE (OUTPATIENT)
Dept: OBGYN CLINIC | Facility: CLINIC | Age: 27
End: 2022-01-17

## 2022-01-17 NOTE — TELEPHONE ENCOUNTER
Pt lmom said she was having side effects and took her Nuvaring out over the weekend  Would like to discuss what the next step would be

## 2022-01-17 NOTE — TELEPHONE ENCOUNTER
Pt states removed her Nuva ring on 1/16/22  First time pt has been on the ring  1/9/22 - inserted first ring  Had inserted for 1 week  1/15/22 - stomachache and on 1/16/22 started with pelvic discomfort, she felt very uncomfortable, tired taking gas and stomach medication but did not help  Experienced 1 episode of vomiting on Sunday 1/16/22, clear fluid  Pt did feel flushed  Removed ring, within 40 minutes of removing ring felt better but did not relief pain completely  She did take Motrin  Last bowel movement, on Saturday felt like she couldn't go but once she removed the ring she had a bowel movement  Pt has had gas pains in the past but medication usually helps  Pt still has appendix  Denies fever or chills  Denies any changes in food or new foods ate  Today pt states she feels better but not 100%  Randomly feeling discomfort  Able to pass gas today  Pt states 1/9/22-1/14/22 felt good with ring no complaints  Ring currently still removed  Pt aware will review with Taurus Curran

## 2022-01-17 NOTE — TELEPHONE ENCOUNTER
Pt is going to look into a location in [place] to complete the [address]  Pt aware to get fax number to be able to send orders to  Pt is comfortable with leaving the ring out until next cycle  First she would like to know what is causing her discomfort prior to reinserting the ring

## 2022-01-21 NOTE — TELEPHONE ENCOUNTER
[facility], fax# [phone]  Pt will complete her pelvic US with this location  She is going to contact the office once she has the [address] completed to verify we get the radiology report

## 2022-02-14 ENCOUNTER — TELEPHONE (OUTPATIENT)
Dept: OBGYN CLINIC | Facility: CLINIC | Age: 27
End: 2022-02-14

## 2022-02-14 DIAGNOSIS — Z30.41 ENCOUNTER FOR SURVEILLANCE OF CONTRACEPTIVE PILLS: Primary | ICD-10-CM

## 2022-02-14 NOTE — TELEPHONE ENCOUNTER
Pt called and would like a call back to explain her Uterine US results  She saw them on my chart and would like a call back

## 2022-02-14 NOTE — TELEPHONE ENCOUNTER
Downloaded records from [facility]  Reviewed ultrasound  Patient states did remove nuvaring at time of discomfort and then it did start to resolve  However took tylenol and also had GI issues  Patient would like to restart OCP  Will restart with next cycle  Rx sent to [facility] to sign   Will call after 2 5 months with update and BP check

## 2022-02-15 RX ORDER — NORETHINDRONE ACETATE AND ETHINYL ESTRADIOL 1MG-20(21)
1 KIT ORAL DAILY
Qty: 90 TABLET | Refills: 0 | Status: SHIPPED | OUTPATIENT
Start: 2022-02-15 | End: 2022-05-09 | Stop reason: SDDI

## 2022-02-15 NOTE — TELEPHONE ENCOUNTER
Spoke with patient and reviewed re-start:   1)  Begin the pill on the Sunday of the week of your next period, starting with the first pill in the packet (If your period starts on a Sunday - start the pill that very same day; if your period starts any other day of the week - wait until the Sunday of that week to start with the first pill)  Take it the same time daily, within the same hour time frame (such as between 8 and 9am)  2) It common to experience some irregular bleeding when newly starting the pill  This should resolve after 3 months of use  Also minor side effects such as breast tenderness, minor headaches and nausea may occur, but typically resolve after continuing on the pill for at least 3 months  3)  Call if you experience severe headaches, visual disturbances, chest pain, shortness of breath, abdominal pain, pain tingling or weakness in arms or legs  4) Advise a back-up method, like condoms, during the first month on the OCP, if misses any pills, or is put on antibiotics  Reviewed missed pill protocol;   5) Advise safe sexual practices and the importance of condoms to prevent the transmission of STDs  6) Return visit in 3 months for pill & blood pressure check  - patient will call in with a BP reading to continue on OCP - currently in [place]

## 2022-04-27 ENCOUNTER — PATIENT MESSAGE (OUTPATIENT)
Dept: OBGYN CLINIC | Facility: CLINIC | Age: 27
End: 2022-04-27

## 2022-05-09 NOTE — PROGRESS NOTES
Rx refill not fulfilled due to no response from patient of either scheduling a pill check or calling in blood pressures

## 2023-08-23 ENCOUNTER — TELEPHONE (OUTPATIENT)
Dept: OBGYN CLINIC | Facility: CLINIC | Age: 28
End: 2023-08-23

## 2023-08-23 NOTE — TELEPHONE ENCOUNTER
Called patient back, she stated that she is trying to conceive since the beginning of the year. She said she was tracking her ovulation and the ovulation tracker said that she was not ovulating. She wanted to discuss this with one of the providers but she is not going to be in the area until October. Patient scheduled an appointment for then.

## 2023-08-23 NOTE — TELEPHONE ENCOUNTER
Patient called, left message on answering machine, trying to conceive and had a few questions about fertility.